# Patient Record
Sex: FEMALE | Race: BLACK OR AFRICAN AMERICAN | NOT HISPANIC OR LATINO
[De-identification: names, ages, dates, MRNs, and addresses within clinical notes are randomized per-mention and may not be internally consistent; named-entity substitution may affect disease eponyms.]

---

## 2019-11-22 VITALS
OXYGEN SATURATION: 100 % | SYSTOLIC BLOOD PRESSURE: 152 MMHG | TEMPERATURE: 100 F | HEART RATE: 91 BPM | DIASTOLIC BLOOD PRESSURE: 70 MMHG | HEIGHT: 62 IN | RESPIRATION RATE: 18 BRPM | WEIGHT: 134.04 LBS

## 2019-11-22 RX ORDER — CHLORHEXIDINE GLUCONATE 213 G/1000ML
1 SOLUTION TOPICAL ONCE
Refills: 0 | Status: DISCONTINUED | OUTPATIENT
Start: 2019-11-25 | End: 2019-11-26

## 2019-11-22 NOTE — H&P ADULT - NSICDXFAMILYHX_GEN_ALL_CORE_FT
FAMILY HISTORY:  FHx: congenital heart disease, father and grandfather both had "similar" congenital abnormalities of the heart. Patient does not know specifics, but knows that her grandfather's needed surgical repair

## 2019-11-22 NOTE — H&P ADULT - NSHPLABSRESULTS_GEN_ALL_CORE
EKG: NSR@ 88bpm, TWI in III, flat T wave in aVF                          12.8   7.66  )-----------( 266      ( 25 Nov 2019 13:04 )             37.7       11-25    139  |  104  |  8   ----------------------------<  85  3.3<L>   |  23  |  0.86    Ca    9.2      25 Nov 2019 13:04    TPro  7.2  /  Alb  4.4  /  TBili  0.3  /  DBili  <0.2  /  AST  19  /  ALT  29  /  AlkPhos  67  11-25      PT/INR - ( 25 Nov 2019 13:04 )   PT: 11.4 sec;   INR: 1.01          PTT - ( 25 Nov 2019 13:04 )  PTT:27.0 sec    CARDIAC MARKERS ( 25 Nov 2019 13:04 )  x     / x     / 89 U/L / x     / <1.0 ng/mL

## 2019-11-22 NOTE — H&P ADULT - RS GEN PE MLT RESP DETAILS PC
no rales/no chest wall tenderness/no intercostal retractions/no rhonchi/no wheezes/clear to auscultation bilaterally

## 2019-11-22 NOTE — H&P ADULT - HISTORY OF PRESENT ILLNESS
35 yo female PMH newly diagnosed malignant anomalous RCA origin, who presented to Dr. Pappas after noticing that her resting HR was 99 bpm on her apple watch. She was a runner but started to notice a progressively decreasing exercise tolerance, was able to run 5 K's but now notices fatigue, palpitations, SOB, and lightheadedness with 5-10 minutes of walking or any running. Separately, she has had episodes of fleeting 4/10 sharp chest pains, non-radiating and not related to exercise. Denies any syncope, orthopnea, PND, LE edema, fevers, chills, or melena. Per patient, she underwent a holter monitor, echo, and exercise stress test, which were all normal. CCTA 11/15/19: Ca score 0. 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 EF 58%. Due to patient's class IV anginal symptoms and abnormal CCTA, patient is now referred for cardiac catheterization. 33 yo female PMH newly diagnosed malignant anomalous RCA origin, who presented to Dr. Pappas after noticing that her resting HR was 99 bpm on her apple watch. She was a runner but started to notice a progressively decreasing exercise tolerance, was able to run 5 K's but now notices fatigue, palpitations, SOB, and lightheadedness with 5-10 minutes of walking or any running. Separately, she has had episodes of fleeting 4/10 sharp chest pains, non-radiating and not related to exercise. Denies any syncope, orthopnea, PND, LE edema, fevers, chills, or melena. Per patient, she underwent a holter monitor, echo, and exercise stress test, which were all normal. CCTA 11/15/19: Ca score 0. 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 EF 58%. Due to patient's class IV anginal symptoms and abnormal CCTA, patient is now referred for cardiac catheterization.

## 2019-11-22 NOTE — H&P ADULT - NSHPSOCIALHISTORY_GEN_ALL_CORE
Admits to hx of daily etoh use, no use in the last month since symptoms worsening.   Denies any tobacco or recreational drug use.

## 2019-11-22 NOTE — H&P ADULT - ATTENDING COMMENTS
Initial contact 11/26/19. See PA HPI written above, for details. I reviewed the PA documentation.  I reviewed vitals, labs, medications, cardiac studies and additional imaging.  I agree with the PA's findings and plans as written above with the following additions/amendments:  Patient now s/p CATH 11/25/19: normal LM, LAD, Lcx, anomalous RCA (dominant) rom L coronary cusp, EF 55%, EDP 15 17 via R radial access  ROS: Patient currently denies cardiopulmonary symptoms. The patient specifically denies CP, SOB, RILEY, LH, dizziness, palpitations.  Physical Exam notable for: young female sitting up in chair in NAD, flat neck veins, RRR, no MGR detected, clear lungs, soft abdomen, no pretibial pitting edema, no ankle edema, skin WWP throughout, A&Ox3, independent  plan:  CTS Br[place] consulted with plans for CABG  Preop exercise NST  recommended and ordered  Dispo pending preop testing and timing of CTSx  SCOT Yoo.  Cardiology Attending

## 2019-11-22 NOTE — H&P ADULT - ASSESSMENT
35 yo female PMH newly diagnosed malignant anomalous RCA origin, who in light of abnormal CTA and CCS IV anginal symptoms presents today for a diagnostic catheterization.     ASA II, Mallampati II    Hgb/HCT 12.8/37.7    Pt agreed to the procedure and signed consent.     Risks & benefits of procedure and alternative therapy have been explained to the patient including but not limited to: allergic reaction, bleeding w/possible need for blood transfusion, infection, renal and vascular compromise, limb damage, arrhythmia, stroke, vessel dissection/perforation, Myocardial infarction, emergent CABG. Informed consent obtained and in chart. 33 yo female PMH newly diagnosed malignant anomalous RCA origin, who in light of abnormal CTA and CCS IV anginal symptoms presents today for a diagnostic catheterization. Pt to follow up with cardiac surgeon next week to discuss possible surgery.     ASA II, Mallampati II    Hgb/HCT 12.8/37.7    Pt agreed to the procedure and signed consent.     Risks & benefits of procedure and alternative therapy have been explained to the patient including but not limited to: allergic reaction, bleeding w/possible need for blood transfusion, infection, renal and vascular compromise, limb damage, arrhythmia, stroke, vessel dissection/perforation, Myocardial infarction, emergent CABG. Informed consent obtained and in chart. 35 yo female PMH newly diagnosed malignant anomalous RCA origin, who in light of abnormal CTA and CCS IV anginal symptoms presents today for a diagnostic catheterization. Pt also being evaluated by CTS for further workout for malignant anomalous RCA origin.    ASA II, Mallampati II    Hgb/HCT 12.8/37.7. As per Dr. Pappas no load given as case is likely diagnostic. IVF NS @75ml/hr. K 3.3 will replete with KCl 40mEq PO x 2.     Pt agreed to the procedure and signed consent.     Risks & benefits of procedure and alternative therapy have been explained to the patient including but not limited to: allergic reaction, bleeding w/possible need for blood transfusion, infection, renal and vascular compromise, limb damage, arrhythmia, stroke, vessel dissection/perforation, Myocardial infarction, emergent CABG. Informed consent obtained and in chart.

## 2019-11-24 ENCOUNTER — FORM ENCOUNTER (OUTPATIENT)
Age: 34
End: 2019-11-24

## 2019-11-25 ENCOUNTER — INPATIENT (INPATIENT)
Facility: HOSPITAL | Age: 34
LOS: 0 days | Discharge: HOME | DRG: 287 | End: 2019-11-26
Attending: INTERNAL MEDICINE | Admitting: INTERNAL MEDICINE
Payer: COMMERCIAL

## 2019-11-25 DIAGNOSIS — Z98.890 OTHER SPECIFIED POSTPROCEDURAL STATES: Chronic | ICD-10-CM

## 2019-11-25 LAB
ALBUMIN SERPL ELPH-MCNC: 4.4 G/DL — SIGNIFICANT CHANGE UP (ref 3.3–5)
ALP SERPL-CCNC: 67 U/L — SIGNIFICANT CHANGE UP (ref 40–120)
ALT FLD-CCNC: 29 U/L — SIGNIFICANT CHANGE UP (ref 10–45)
ANION GAP SERPL CALC-SCNC: 12 MMOL/L — SIGNIFICANT CHANGE UP (ref 5–17)
APTT BLD: 27 SEC — LOW (ref 27.5–36.3)
AST SERPL-CCNC: 19 U/L — SIGNIFICANT CHANGE UP (ref 10–40)
BASOPHILS # BLD AUTO: 0.07 K/UL — SIGNIFICANT CHANGE UP (ref 0–0.2)
BASOPHILS NFR BLD AUTO: 0.9 % — SIGNIFICANT CHANGE UP (ref 0–2)
BILIRUB SERPL-MCNC: 0.3 MG/DL — SIGNIFICANT CHANGE UP (ref 0.2–1.2)
BUN SERPL-MCNC: 8 MG/DL — SIGNIFICANT CHANGE UP (ref 7–23)
CALCIUM SERPL-MCNC: 9.2 MG/DL — SIGNIFICANT CHANGE UP (ref 8.4–10.5)
CHLORIDE SERPL-SCNC: 104 MMOL/L — SIGNIFICANT CHANGE UP (ref 96–108)
CHOLEST SERPL-MCNC: 166 MG/DL — SIGNIFICANT CHANGE UP (ref 10–199)
CK MB CFR SERPL CALC: <1 NG/ML — SIGNIFICANT CHANGE UP (ref 0–6.7)
CK SERPL-CCNC: 89 U/L — SIGNIFICANT CHANGE UP (ref 25–170)
CO2 SERPL-SCNC: 23 MMOL/L — SIGNIFICANT CHANGE UP (ref 22–31)
CREAT SERPL-MCNC: 0.86 MG/DL — SIGNIFICANT CHANGE UP (ref 0.5–1.3)
EOSINOPHIL # BLD AUTO: 0.05 K/UL — SIGNIFICANT CHANGE UP (ref 0–0.5)
EOSINOPHIL NFR BLD AUTO: 0.7 % — SIGNIFICANT CHANGE UP (ref 0–6)
GLUCOSE SERPL-MCNC: 85 MG/DL — SIGNIFICANT CHANGE UP (ref 70–99)
HBA1C BLD-MCNC: 5.1 % — SIGNIFICANT CHANGE UP (ref 4–5.6)
HCG SERPL-ACNC: <0 MIU/ML — SIGNIFICANT CHANGE UP
HCT VFR BLD CALC: 37.7 % — SIGNIFICANT CHANGE UP (ref 34.5–45)
HDLC SERPL-MCNC: 42 MG/DL — LOW
HGB BLD-MCNC: 12.8 G/DL — SIGNIFICANT CHANGE UP (ref 11.5–15.5)
IMM GRANULOCYTES NFR BLD AUTO: 0.3 % — SIGNIFICANT CHANGE UP (ref 0–1.5)
INR BLD: 1.01 — SIGNIFICANT CHANGE UP (ref 0.88–1.16)
LIPID PNL WITH DIRECT LDL SERPL: 83 MG/DL — SIGNIFICANT CHANGE UP
LYMPHOCYTES # BLD AUTO: 2.05 K/UL — SIGNIFICANT CHANGE UP (ref 1–3.3)
LYMPHOCYTES # BLD AUTO: 26.8 % — SIGNIFICANT CHANGE UP (ref 13–44)
MCHC RBC-ENTMCNC: 30 PG — SIGNIFICANT CHANGE UP (ref 27–34)
MCHC RBC-ENTMCNC: 34 GM/DL — SIGNIFICANT CHANGE UP (ref 32–36)
MCV RBC AUTO: 88.5 FL — SIGNIFICANT CHANGE UP (ref 80–100)
MONOCYTES # BLD AUTO: 0.54 K/UL — SIGNIFICANT CHANGE UP (ref 0–0.9)
MONOCYTES NFR BLD AUTO: 7 % — SIGNIFICANT CHANGE UP (ref 2–14)
NEUTROPHILS # BLD AUTO: 4.93 K/UL — SIGNIFICANT CHANGE UP (ref 1.8–7.4)
NEUTROPHILS NFR BLD AUTO: 64.3 % — SIGNIFICANT CHANGE UP (ref 43–77)
NRBC # BLD: 0 /100 WBCS — SIGNIFICANT CHANGE UP (ref 0–0)
PLATELET # BLD AUTO: 266 K/UL — SIGNIFICANT CHANGE UP (ref 150–400)
POTASSIUM SERPL-MCNC: 3.3 MMOL/L — LOW (ref 3.5–5.3)
POTASSIUM SERPL-SCNC: 3.3 MMOL/L — LOW (ref 3.5–5.3)
PROT SERPL-MCNC: 7.2 G/DL — SIGNIFICANT CHANGE UP (ref 6–8.3)
PROTHROM AB SERPL-ACNC: 11.4 SEC — SIGNIFICANT CHANGE UP (ref 10–12.9)
RBC # BLD: 4.26 M/UL — SIGNIFICANT CHANGE UP (ref 3.8–5.2)
RBC # FLD: 12.5 % — SIGNIFICANT CHANGE UP (ref 10.3–14.5)
SODIUM SERPL-SCNC: 139 MMOL/L — SIGNIFICANT CHANGE UP (ref 135–145)
TOTAL CHOLESTEROL/HDL RATIO MEASUREMENT: 4 RATIO — SIGNIFICANT CHANGE UP (ref 3.3–7.1)
TRIGL SERPL-MCNC: 205 MG/DL — HIGH (ref 10–149)
WBC # BLD: 7.66 K/UL — SIGNIFICANT CHANGE UP (ref 3.8–10.5)
WBC # FLD AUTO: 7.66 K/UL — SIGNIFICANT CHANGE UP (ref 3.8–10.5)

## 2019-11-25 PROCEDURE — 93306 TTE W/DOPPLER COMPLETE: CPT | Mod: 26

## 2019-11-25 PROCEDURE — 94010 BREATHING CAPACITY TEST: CPT | Mod: 26

## 2019-11-25 PROCEDURE — 71045 X-RAY EXAM CHEST 1 VIEW: CPT | Mod: 26

## 2019-11-25 PROCEDURE — 93458 L HRT ARTERY/VENTRICLE ANGIO: CPT | Mod: 26

## 2019-11-25 PROCEDURE — 93880 EXTRACRANIAL BILAT STUDY: CPT | Mod: 26

## 2019-11-25 PROCEDURE — 99252 IP/OBS CONSLTJ NEW/EST SF 35: CPT

## 2019-11-25 RX ORDER — ACETAMINOPHEN 500 MG
650 TABLET ORAL ONCE
Refills: 0 | Status: COMPLETED | OUTPATIENT
Start: 2019-11-25 | End: 2019-11-25

## 2019-11-25 RX ORDER — SODIUM CHLORIDE 9 MG/ML
500 INJECTION INTRAMUSCULAR; INTRAVENOUS; SUBCUTANEOUS
Refills: 0 | Status: DISCONTINUED | OUTPATIENT
Start: 2019-11-25 | End: 2019-11-25

## 2019-11-25 RX ORDER — MECLIZINE HCL 12.5 MG
12.5 TABLET ORAL ONCE
Refills: 0 | Status: DISCONTINUED | OUTPATIENT
Start: 2019-11-25 | End: 2019-11-26

## 2019-11-25 RX ORDER — INFLUENZA VIRUS VACCINE 15; 15; 15; 15 UG/.5ML; UG/.5ML; UG/.5ML; UG/.5ML
0.5 SUSPENSION INTRAMUSCULAR ONCE
Refills: 0 | Status: COMPLETED | OUTPATIENT
Start: 2019-11-25 | End: 2019-11-25

## 2019-11-25 RX ORDER — SODIUM CHLORIDE 9 MG/ML
500 INJECTION INTRAMUSCULAR; INTRAVENOUS; SUBCUTANEOUS
Refills: 0 | Status: DISCONTINUED | OUTPATIENT
Start: 2019-11-25 | End: 2019-11-26

## 2019-11-25 RX ORDER — POTASSIUM CHLORIDE 20 MEQ
40 PACKET (EA) ORAL ONCE
Refills: 0 | Status: DISCONTINUED | OUTPATIENT
Start: 2019-11-25 | End: 2019-11-26

## 2019-11-25 RX ADMIN — Medication 650 MILLIGRAM(S): at 19:37

## 2019-11-25 RX ADMIN — Medication 650 MILLIGRAM(S): at 22:02

## 2019-11-25 NOTE — CHART NOTE - NSCHARTNOTEFT_GEN_A_CORE
PA called by RN as pt. c/o blurry vision and bright lights; pt. seen at bedside; laying down comfortably; VSS; AO X 3; neuro exam normal; reports that she has hx of vertigo in the past and the sx are similar to prior vertigo attack; Fellow Angelina aware; pt. given Meclizine 12.5 mg PO X 1; will continue to monitor.

## 2019-11-25 NOTE — CONSULT NOTE ADULT - SUBJECTIVE AND OBJECTIVE BOX
Preventive Cardiology Consultation Note    Cardiologist - Dr. Francesco Sheriff     CHIEF COMPLAINT: s/p cardiac catheterization requiring cardiovascular prevention optimization and education    HISTORY OF PRESENT ILLNESS: 35 [facility] [place] [facility], with no significant PMHx, and newly diagnosed malignant anomalous RCA origin, discovered on CCTA 11/15/19: Ca score 0. 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 Patient is now s/p cardiac cath today prior to CT surgery 11/25/19: normal LM, LAD, Lcx; anomalous RCA (dominant) rom L coronary cusp.    Review of systems otherwise negative.     Lifestyle History:  Mediterranean Diet Score (9 question survey) was 7.   (8-9: optimal, 6-7: near-optimal, 4-5: suboptimal, 0-3: markedly suboptimal)  Exercise: Patient reports exercising at a moderate level for 120-150 minutes per week.   Smoking: Patient denies any history of smoking.   Stress: Patient denies any stress.     PAST MEDICAL & SURGICAL HISTORY:  Anomalous origin of right coronary artery  H/O inguinal hernia repair: age 4    FAMILY HISTORY:   Patient denies any first degree family history of premature CAD or CVA.   Congenital heart disease, father and grandfather both had "similar" congenital abnormalities    Allergies:   lactose (Diarrhea)  No Known Allergies      HOME MEDICATIONS:   Microgestin 1/20: Pt takes Microgynon 30  (25 Nov 2019 14:06)      PHYSICAL EXAM:  · Temp (F)	99.5 Degrees F	  · Temp (C)	37.5 Degrees C	  · Heart Rate	91 /min	  · Respiration Rate (breaths/min)	18 /min	  · BP Systolic	152 mm Hg	  · BP Diastolic	70 mm Hg	  · Blood Pressure - Method	electronic	  · BP Noninvasive Mean	97 mm Hg	  · SpO2 (%)	100 %	  · O2 delivery	room air	    Height (cm): 157.48 (11-25 @ 14:17)  Weight (kg): 60.8 (11-25 @ 14:17)  BMI (kg/m2): 24.5 (11-25 @ 14:17)  	  Gen- awake, conversive  Head-NCAT; eyes: no corneal arcus noted b/l; no xanthelasmas   Neck- no thyromegaly, no cervical LAD, no JVD, no carotid bruit b/l  Respiratory- good air entry b/l, no WRR  Cardiovascular- S1S2, RRR, no MRG appr, no LE edema b/l, distal pulses 2+ b/l  Gastrointestinal- no HSM, no masses  Neurology- moves all extremities, no focal deficits  Psych- normal affect, non-depressed mood  Skin- no lesions, no rashes, no xanthomas     LABS:	                          12.8   7.66  )-----------( 266      ( 25 Nov 2019 13:04 )             37.7     11-25    139  |  104  |  8   ----------------------------<  85  3.3<L>   |  23  |  0.86    Ca    9.2      25 Nov 2019 13:04    TPro  7.2  /  Alb  4.4  /  TBili  0.3  /  DBili  <0.2  /  AST  19  /  ALT  29  /  AlkPhos  67  11-25      Hemoglobin A1C, Whole Blood: 5.1 % (11-25 @ 13:04)      Cholesterol, Serum: 166 mg/dL (11-25 @ 13:04)  HDL Cholesterol, Serum: 42 mg/dL (11-25 @ 13:04)  Triglycerides, Serum: 205 mg/dL (11-25 @ 13:04)  Direct LDL: 83 mg/dL (11-25 @ 13:04)      ASSESSMENT/RECOMMENDATIONS: 	  Patient's dietary, exercise and overall lifestyle habits were reviewed. The concept of atherosclerosis and its systemic nature was discussed with a focus on the need to get all cardiovascular risk factors to goal. At this time, I would like to make the following recommendations to optimize atherosclerotic risk factors.     RECOMMENDATIONS:   Anti-platelet Therapy: APT per interventionalist recommendation.   Lipid Therapy: Patient denies any previous statin or lipid lowering therapy. Her cardiac cath today revealed normal coronaries in regard to atherosclerosis, and she is at a low risk of ASCVD. She has quite a good lifestyle and has always been active and a runner. It is reasonable to continue with lifestyle alone from a prevention standpoint at this time.   Hypertension: Blood pressures during this stay were well-controlled.   Mediterranean Diet Score is 7. Some suggestions include continue incorporating 2 or more servings per day of vegetables, fruits, and whole grains. Increase intake of fish and legumes/beans to 2 or more servings per week. Aim to increase intake of healthy fats, such as olive oil and avocados, and have a handful of nuts/seeds most days. Reduce red/processed meat consumption to 2 or fewer times per week.   Exercise: Recommended gradually increasing activity to 30-45 minutes most days of the week once cleared by referring cardiologist.   Medication Adherence: Patient has no issues with adherence at this time.   Smoking: This patient is a non-smoker.   Obesity/Overweight: The patient is at a normal weight currently, and BMI is WNL at 24.5.  Glucometabolic State: Patient's blood sugar is well-controlled at this time. HbA1c today was 5.1%.   Sleep Apnea: The patient is at low risk for sleep apnea.   Psychological Stress: The patient appears to be coping with stressors well at this time.     Thank you for the opportunity to see this patient. Please feel free to contact Prevention if there are any questions, or if you feel that your patient would benefit from continued follow-up visits with the Program.    I am acting as a scribe on behalf of Dr. Opal Doan,    Margo Geronimo, [facility]  Cardiovascular Prevention     Opal Doan MD  System Director, Cardiovascular Prevention

## 2019-11-26 ENCOUNTER — TRANSCRIPTION ENCOUNTER (OUTPATIENT)
Age: 34
End: 2019-11-26

## 2019-11-26 VITALS
SYSTOLIC BLOOD PRESSURE: 141 MMHG | DIASTOLIC BLOOD PRESSURE: 77 MMHG | RESPIRATION RATE: 18 BRPM | HEART RATE: 96 BPM | OXYGEN SATURATION: 96 %

## 2019-11-26 PROBLEM — Z00.00 ENCOUNTER FOR PREVENTIVE HEALTH EXAMINATION: Status: ACTIVE | Noted: 2019-11-26

## 2019-11-26 PROBLEM — Q24.5 MALFORMATION OF CORONARY VESSELS: Chronic | Status: ACTIVE | Noted: 2019-11-22

## 2019-11-26 LAB
ANION GAP SERPL CALC-SCNC: 10 MMOL/L — SIGNIFICANT CHANGE UP (ref 5–17)
APPEARANCE UR: CLEAR — SIGNIFICANT CHANGE UP
BILIRUB UR-MCNC: NEGATIVE — SIGNIFICANT CHANGE UP
BLD GP AB SCN SERPL QL: NEGATIVE — SIGNIFICANT CHANGE UP
BUN SERPL-MCNC: 8 MG/DL — SIGNIFICANT CHANGE UP (ref 7–23)
CALCIUM SERPL-MCNC: 8.9 MG/DL — SIGNIFICANT CHANGE UP (ref 8.4–10.5)
CHLORIDE SERPL-SCNC: 108 MMOL/L — SIGNIFICANT CHANGE UP (ref 96–108)
CO2 SERPL-SCNC: 22 MMOL/L — SIGNIFICANT CHANGE UP (ref 22–31)
COLOR SPEC: YELLOW — SIGNIFICANT CHANGE UP
CREAT SERPL-MCNC: 0.88 MG/DL — SIGNIFICANT CHANGE UP (ref 0.5–1.3)
DIFF PNL FLD: ABNORMAL
GLUCOSE SERPL-MCNC: 104 MG/DL — HIGH (ref 70–99)
GLUCOSE UR QL: NEGATIVE — SIGNIFICANT CHANGE UP
HCT VFR BLD CALC: 38.3 % — SIGNIFICANT CHANGE UP (ref 34.5–45)
HGB BLD-MCNC: 12.5 G/DL — SIGNIFICANT CHANGE UP (ref 11.5–15.5)
KETONES UR-MCNC: NEGATIVE — SIGNIFICANT CHANGE UP
LEUKOCYTE ESTERASE UR-ACNC: NEGATIVE — SIGNIFICANT CHANGE UP
MAGNESIUM SERPL-MCNC: 2.1 MG/DL — SIGNIFICANT CHANGE UP (ref 1.6–2.6)
MCHC RBC-ENTMCNC: 29.6 PG — SIGNIFICANT CHANGE UP (ref 27–34)
MCHC RBC-ENTMCNC: 32.6 GM/DL — SIGNIFICANT CHANGE UP (ref 32–36)
MCV RBC AUTO: 90.5 FL — SIGNIFICANT CHANGE UP (ref 80–100)
NITRITE UR-MCNC: NEGATIVE — SIGNIFICANT CHANGE UP
NRBC # BLD: 0 /100 WBCS — SIGNIFICANT CHANGE UP (ref 0–0)
PH UR: 7 — SIGNIFICANT CHANGE UP (ref 5–8)
PLATELET # BLD AUTO: 260 K/UL — SIGNIFICANT CHANGE UP (ref 150–400)
POTASSIUM SERPL-MCNC: 4.1 MMOL/L — SIGNIFICANT CHANGE UP (ref 3.5–5.3)
POTASSIUM SERPL-SCNC: 4.1 MMOL/L — SIGNIFICANT CHANGE UP (ref 3.5–5.3)
PROT UR-MCNC: NEGATIVE MG/DL — SIGNIFICANT CHANGE UP
RBC # BLD: 4.23 M/UL — SIGNIFICANT CHANGE UP (ref 3.8–5.2)
RBC # FLD: 12.4 % — SIGNIFICANT CHANGE UP (ref 10.3–14.5)
RH IG SCN BLD-IMP: POSITIVE — SIGNIFICANT CHANGE UP
SODIUM SERPL-SCNC: 140 MMOL/L — SIGNIFICANT CHANGE UP (ref 135–145)
SP GR SPEC: 1.01 — SIGNIFICANT CHANGE UP (ref 1–1.03)
TSH SERPL-MCNC: 0.89 UIU/ML — SIGNIFICANT CHANGE UP (ref 0.35–4.94)
UROBILINOGEN FLD QL: 0.2 E.U./DL — SIGNIFICANT CHANGE UP
WBC # BLD: 9.05 K/UL — SIGNIFICANT CHANGE UP (ref 3.8–10.5)
WBC # FLD AUTO: 9.05 K/UL — SIGNIFICANT CHANGE UP (ref 3.8–10.5)

## 2019-11-26 PROCEDURE — 85610 PROTHROMBIN TIME: CPT

## 2019-11-26 PROCEDURE — 83036 HEMOGLOBIN GLYCOSYLATED A1C: CPT

## 2019-11-26 PROCEDURE — 80061 LIPID PANEL: CPT

## 2019-11-26 PROCEDURE — 86901 BLOOD TYPING SEROLOGIC RH(D): CPT

## 2019-11-26 PROCEDURE — C1887: CPT

## 2019-11-26 PROCEDURE — 83735 ASSAY OF MAGNESIUM: CPT

## 2019-11-26 PROCEDURE — 78452 HT MUSCLE IMAGE SPECT MULT: CPT

## 2019-11-26 PROCEDURE — 93018 CV STRESS TEST I&R ONLY: CPT

## 2019-11-26 PROCEDURE — 85025 COMPLETE CBC W/AUTO DIFF WBC: CPT

## 2019-11-26 PROCEDURE — 36415 COLL VENOUS BLD VENIPUNCTURE: CPT

## 2019-11-26 PROCEDURE — 82550 ASSAY OF CK (CPK): CPT

## 2019-11-26 PROCEDURE — 84443 ASSAY THYROID STIM HORMONE: CPT

## 2019-11-26 PROCEDURE — 80053 COMPREHEN METABOLIC PANEL: CPT

## 2019-11-26 PROCEDURE — 93017 CV STRESS TEST TRACING ONLY: CPT

## 2019-11-26 PROCEDURE — A9500: CPT

## 2019-11-26 PROCEDURE — 86900 BLOOD TYPING SEROLOGIC ABO: CPT

## 2019-11-26 PROCEDURE — 93880 EXTRACRANIAL BILAT STUDY: CPT

## 2019-11-26 PROCEDURE — C1894: CPT

## 2019-11-26 PROCEDURE — 80048 BASIC METABOLIC PNL TOTAL CA: CPT

## 2019-11-26 PROCEDURE — 93306 TTE W/DOPPLER COMPLETE: CPT

## 2019-11-26 PROCEDURE — 82553 CREATINE MB FRACTION: CPT

## 2019-11-26 PROCEDURE — 78452 HT MUSCLE IMAGE SPECT MULT: CPT | Mod: 26

## 2019-11-26 PROCEDURE — C1769: CPT

## 2019-11-26 PROCEDURE — 81001 URINALYSIS AUTO W/SCOPE: CPT

## 2019-11-26 PROCEDURE — 71045 X-RAY EXAM CHEST 1 VIEW: CPT

## 2019-11-26 PROCEDURE — 93016 CV STRESS TEST SUPVJ ONLY: CPT

## 2019-11-26 PROCEDURE — 94150 VITAL CAPACITY TEST: CPT

## 2019-11-26 PROCEDURE — 99222 1ST HOSP IP/OBS MODERATE 55: CPT

## 2019-11-26 PROCEDURE — 85027 COMPLETE CBC AUTOMATED: CPT

## 2019-11-26 PROCEDURE — 84702 CHORIONIC GONADOTROPIN TEST: CPT

## 2019-11-26 PROCEDURE — 86850 RBC ANTIBODY SCREEN: CPT

## 2019-11-26 PROCEDURE — 82248 BILIRUBIN DIRECT: CPT

## 2019-11-26 PROCEDURE — 99223 1ST HOSP IP/OBS HIGH 75: CPT

## 2019-11-26 PROCEDURE — A9505: CPT

## 2019-11-26 PROCEDURE — 85730 THROMBOPLASTIN TIME PARTIAL: CPT

## 2019-11-26 NOTE — DISCHARGE NOTE PROVIDER - PROVIDER TOKENS
PROVIDER:[TOKEN:[[id number]]] PROVIDER:[TOKEN:[[id number]],SCHEDULEDAPPT:[11/27/2019],SCHEDULEDAPPTTIME:[10:15 AM]] PROVIDER:[TOKEN:[[id number]]]

## 2019-11-26 NOTE — CONSULT NOTE ADULT - SUBJECTIVE AND OBJECTIVE BOX
Surgeon: Dr. Wooten    Requesting Physician: Dr. Cesar Pappas    HISTORY OF PRESENT ILLNESS (Need 4):    35 yo female, [facility] resident, with no significant PMHx, who began noticing decreasing exercise tolerance over the past 3 years.  She had previously been very active and was a regular runner, but began developing dyspnea and chest tightness that would limit her.  She was seen by a cardiologist 3 years ago and found to have some ectopic beats and was placed on a beta blocker without any symtpomatic improvement.  Her symptoms continued to worsen to the point that she would get symptomatic after walking for 10 minutes.  She also reports occasional resting symptoms which would occur once or twice a week, but she feels that these resting symptoms have become less frequent in the past year, although her exertional symptoms have increased.  The exertional symptoms are also associated with lightheadedness, but she denies any episodes of syncope.  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  This finding prompted a diagnostic left heart cath on 11/15 which confirmed the finding.  Of note, she reports her father was diagnosed with a similar coronary anomaly on routine screening when he was in his 50's, and was offered a surgical option but deferred 2/2 his age and minimal symptoms.  Her paternal grandfather also had a history of coronary disease although it is not known if this was related to anomalous coronary anatomy.      PAST MEDICAL & SURGICAL HISTORY:  Anomalous origin of right coronary artery  H/O inguinal hernia repair: age 4      MEDICATIONS  (STANDING):  chlorhexidine 4% Liquid 1 Application(s) Topical once  meclizine 12.5 milliGRAM(s) Oral Once  potassium chloride    Tablet ER 40 milliEquivalent(s) Oral Once  sodium chloride 0.9%. 500 milliLiter(s) (75 mL/Hr) IV Continuous <Continuous>    MEDICATIONS  (PRN):      Allergies    No Known Allergies    Intolerances    lactose (Diarrhea)    SOCIAL HISTORY:  Smoker:   NO          ETOH use:   NO, former daily drinker but quit years ago            Ilicit Drug use:   NO  Occupation:   Assisted device use (Cane / Walker):  Live with: lives independently in [facility], currently staying in a hotel in [place] with boyfriend, but does not have a residence in [place]    FAMILY HISTORY:  FHx: congenital heart disease: father and grandfather both had similar congenital abnormalities of the heart.     Review of Systems (Need 10):  CONSTITUTIONAL: Denies fevers / chills, sweats, fatigue, weight loss, weight gain                                       NEURO:  dizziness/lightheadedness as per HPI, also reports infrequent migrains but denies parathesias, seizures, syncope, confusion                                                                                  EYES:  Denies blurry vision, discharge, pain, loss of vision                                                                                    ENMT:  hx of vertigo, but denies difficulty hearing, dysphagia, epistaxis, recent dental work                                       CV:  chest pressure and RILEY as per HPI but denies palpitations or1 orthopnea                                                                                           RESPIRATORY:  Denies wWheezing, SOB, cough / sputum, hemoptysis                                                               GI:  Denies nausea, vomiting, diarrhea, constipation, melena                                                                          : Denies hematuria, dysuria, urgency, incontinence                                                                                          MUSKULOSKELETAL:  Denies arthritis, joint swelling, muscle weakness                                                             SKIN/BREAST:  Denies rash, itching, hair loss, masses                                                                                              PSYCH:  Denies depression, anxiety, suicidal ideation                                                                                                HEME/LYMPH:  Denies bruises easily, enlarged lymph nodes, tender lymph nodes                                          ENDOCRINE:  Denies cold intolerance, heat intolerance, polydipsia                                                                      Vital Signs Last 24 Hrs  T(C): 36.3 (26 Nov 2019 09:06), Max: 36.3 (26 Nov 2019 00:12)  T(F): 97.4 (26 Nov 2019 09:06), Max: 97.4 (26 Nov 2019 09:06)  HR: 82 (26 Nov 2019 08:54) (78 - 90)  BP: 131/78 (26 Nov 2019 08:54) (110/67 - 132/87)  BP(mean): --  RR: 18 (26 Nov 2019 08:54) (16 - 18)  SpO2: 96% (26 Nov 2019 08:54) (95% - 97%)    Physical Exam (Need 8)  CONSTITUTIONAL: WN/WD, NAD  NEURO: A&Ox3                   EYES: EOMI sclera anicteric  ENMT: MMM  CV: S1S2 RRR  RESPIRATORY: CTA b/l no W/R/R  GI: soft NT/ND +BS  : no welch  MUSKULOSKELETAL: no kyphosis/scoliosis  SKIN / BREAST: no rashes/lesions                                                            LABS:                        12.5   9.05  )-----------( 260      ( 26 Nov 2019 06:33 )             38.3     11-26    140  |  108  |  8   ----------------------------<  104<H>  4.1   |  22  |  0.88    Ca    8.9      26 Nov 2019 06:33  Mg     2.1     11-26    TPro  7.2  /  Alb  4.4  /  TBili  0.3  /  DBili  <0.2  /  AST  19  /  ALT  29  /  AlkPhos  67  11-25    PT/INR - ( 25 Nov 2019 13:04 )   PT: 11.4 sec;   INR: 1.01          PTT - ( 25 Nov 2019 13:04 )  PTT:27.0 sec    CARDIAC MARKERS ( 25 Nov 2019 13:04 )  x     / x     / 89 U/L / x     / <1.0 ng/mL    RADIOLOGY & ADDITIONAL STUDIES:  CAROTID U/S: no HD signif stenosis    CXR: no efusions/infiltrates/PTX    CT Scan: coronary CTA findings as above    EKG: NSR    TTE / FLORIDALMA: EF 65%, no HD signif valvular disease    Cardiac Cath: anomalous RCA as above

## 2019-11-26 NOTE — DISCHARGE NOTE PROVIDER - CARE PROVIDERS DIRECT ADDRESSES
[email or web address]

## 2019-11-26 NOTE — DISCHARGE NOTE PROVIDER - CARE PROVIDER_API CALL
Ventura Wooten (MD)  Surgery; Thoracic and Cardiac Surgery  [address] [place] [address]  Phone: [phone]  Fax: [phone]  Follow Up Time: Ventrua Wooten (MD)  Surgery; Thoracic and Cardiac Surgery  [address] [place] [address]  Phone: [phone]  Fax: [phone]  Scheduled Appointment: 11/27/2019 10:15 AM Ventura Wooten (MD)  Surgery; Thoracic and Cardiac Surgery  [address] [place] [address]  Phone: [phone]  Fax: [phone]  Follow Up Time:

## 2019-11-26 NOTE — DISCHARGE NOTE PROVIDER - NSDCCPCAREPLAN_GEN_ALL_CORE_FT
PRINCIPAL DISCHARGE DIAGNOSIS  Diagnosis: Anomalous right coronary artery  Assessment and Plan of Treatment: You were admitted to evaluate chest pain and shortness of breath with an abnormal CT scan of the heart arteries. An ultrasound of the heart (echocardiogram) was preformed which was normal. A cardiac catheterization was preformed and showed your heart arteries were clear. A stress test was preformed which was normal. CT surgery was consulted and did pre operative testing. They scheduled you for treatment of the anomalous right coronary artery with a surgery on Monday 12/2/19. Please follow up with the NPs tomorrow 11/27/19 in the [facility] of [facility]. The information has been attached for you. Please make an appointment and ensure you go to the appointment tomorrow. No medications have been added to your regemin. PRINCIPAL DISCHARGE DIAGNOSIS  Diagnosis: Anomalous right coronary artery  Assessment and Plan of Treatment: You were admitted to evaluate chest pain and shortness of breath with an abnormal CT scan of the heart arteries. An ultrasound of the heart (echocardiogram) was preformed which was normal. A cardiac catheterization was preformed and showed your heart arteries were clear. A stress test was preformed which was normal. CT surgery was consulted and did pre operative testing. They scheduled you for treatment of the anomalous right coronary artery with a surgery on Monday 12/2/19. Please follow up with the NPs tomorrow 11/27/19 at 10:15AM in the [place] [facility] of [facility] on the 4th floor. The information has been attached for you.  No medications have been added to your regemin. PRINCIPAL DISCHARGE DIAGNOSIS  Diagnosis: Anomalous right coronary artery  Assessment and Plan of Treatment: You were admitted to evaluate chest pain and shortness of breath with an abnormal CT scan of the heart arteries. An ultrasound of the heart (echocardiogram) was preformed which was normal. A cardiac catheterization was preformed and showed your heart arteries were clear. A stress test was preformed which was normal. CT surgery was consulted and did pre operative testing. It was decided you should meet in person with Dr. Wooten ([facility]) on Monday 12/2/19 to further discuss the surgery and if there is a true indication for it. You do NOT need to go to your appointment tomorrow on the 4th floor of [facility]. Someone from [facility] will call you to set up the time for this appointment. If they do not, I have attached the phone number for Dr. Wooten's office. After your meeting, Dr. Pappas would like you to call him on his cell phone [phone]. No medications have been added to your regemin.

## 2019-11-26 NOTE — DIETITIAN INITIAL EVALUATION ADULT. - ENERGY NEEDS
IBW used to calculate energy needs due to pt's current body weight exceeding 120% of IBW  Needs calculated for age and weight maintenance.

## 2019-11-26 NOTE — DISCHARGE NOTE NURSING/CASE MANAGEMENT/SOCIAL WORK - PATIENT PORTAL LINK FT
You can access the FollowMyHealth Patient Portal offered by [facility] by registering at the following website: [email or web address]. By joining [employer]’s FollowMyHealth portal, you will also be able to view your health information using other applications (apps) compatible with our system.

## 2019-11-26 NOTE — DIETITIAN INITIAL EVALUATION ADULT. - OTHER INFO
Pt with past medical history significant for dyspnea and chest tightness with exercise x3 years.  Pt is s/p coronary CTA (11/15) showing malignant anomalous RCA originating from the left coronary sinus and coursing between the PA trunk and the anterior aortic trunk.  Pt is s/p diagnostic cath confirming findings.  Plan for transfer to CTICU for CABG 12/2.  Pt was following a Regular diet prior to admission; good appetite/intake at baseline.  Pt with good appetite since admission; consuming ~75% of meals.  Pt denies n/v/d/c or pain.  Skin: intact pressure-wise, surgical incision to right wrist.

## 2019-11-26 NOTE — DISCHARGE NOTE PROVIDER - NSDCFUSCHEDAPPT_GEN_ALL_CORE_FT
SHAWN MUÑIZ ; 12/02/2019 ; [facility] [address] SHAWN MUÑIZ ; 11/27/2019 ; [facility] CT Surg [address]  SHAWN MUÑIZ ; 12/02/2019 ; [facility] [address] SHAWN MUÑIZ ; 11/27/2019 ; hospitals CT Surg [address]  SHAWN MUÑIZ ; 12/02/2019 ; [facility] [address]

## 2019-11-26 NOTE — CONSULT NOTE ADULT - ASSESSMENT
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 and coursing between the PA trunk and the anterior aortic trunk     Plan:  Problem 1: anomalous RCA   - stress echo today, f/u results   - plan for CABG Monday   - complete pre-op workup with PFTs and T&S    Problem 2: Migraines/Vertigo   - had an episode of vertigo overnight, responded well to meclizine   - c/w meclizine prn    Problem 3: dyslipidemia   - elevated TG levels with low HDL   - preventative cardiology recommendations appreciated, c/w recommended lifestyle changes   - cont to monitor lipid profile annually and consider statin if no signif improvements with lifestyle changes    Problem 4: hypokalemia   - potassium repleted and repeat K WNL   - cont to monitor renal function and lytes    I have reviewed clinical labs tests and reports, radiology tests and reports, as well as old patient medical records, and discussed with the refering physician.

## 2019-11-29 PROBLEM — Z82.79 FAMILY HISTORY OF FIRST DEGREE RELATIVE WITH CONGENITAL HEART DISEASE: Status: ACTIVE | Noted: 2019-11-29

## 2019-12-02 ENCOUNTER — APPOINTMENT (OUTPATIENT)
Dept: CARDIOTHORACIC SURGERY | Facility: CLINIC | Age: 34
End: 2019-12-02
Payer: COMMERCIAL

## 2019-12-02 VITALS
HEART RATE: 95 BPM | SYSTOLIC BLOOD PRESSURE: 137 MMHG | TEMPERATURE: 99.2 F | DIASTOLIC BLOOD PRESSURE: 73 MMHG | RESPIRATION RATE: 18 BRPM | BODY MASS INDEX: 24.11 KG/M2 | WEIGHT: 131 LBS | HEIGHT: 62 IN | OXYGEN SATURATION: 99 %

## 2019-12-02 DIAGNOSIS — Q24.5 MALFORMATION OF CORONARY VESSELS: ICD-10-CM

## 2019-12-02 DIAGNOSIS — I20.0 UNSTABLE ANGINA: ICD-10-CM

## 2019-12-02 DIAGNOSIS — Z78.9 OTHER SPECIFIED HEALTH STATUS: ICD-10-CM

## 2019-12-02 DIAGNOSIS — E87.6 HYPOKALEMIA: ICD-10-CM

## 2019-12-02 DIAGNOSIS — G43.909 MIGRAINE, UNSPECIFIED, NOT INTRACTABLE, WITHOUT STATUS MIGRAINOSUS: ICD-10-CM

## 2019-12-02 DIAGNOSIS — I20.9 ANGINA PECTORIS, UNSPECIFIED: ICD-10-CM

## 2019-12-02 PROCEDURE — 99213 OFFICE O/P EST LOW 20 MIN: CPT

## 2019-12-03 VITALS
WEIGHT: 131 LBS | DIASTOLIC BLOOD PRESSURE: 73 MMHG | OXYGEN SATURATION: 99 % | BODY MASS INDEX: 24.11 KG/M2 | RESPIRATION RATE: 18 BRPM | TEMPERATURE: 99.2 F | HEART RATE: 95 BPM | SYSTOLIC BLOOD PRESSURE: 137 MMHG | HEIGHT: 62 IN

## 2019-12-03 VITALS
HEIGHT: 62 IN | WEIGHT: 134.04 LBS | TEMPERATURE: 100 F | SYSTOLIC BLOOD PRESSURE: 152 MMHG | RESPIRATION RATE: 18 BRPM | HEART RATE: 91 BPM | OXYGEN SATURATION: 100 % | DIASTOLIC BLOOD PRESSURE: 70 MMHG

## 2019-12-03 PROBLEM — Z78.9 NO PERTINENT PAST MEDICAL HISTORY: Status: RESOLVED | Noted: 2019-12-03 | Resolved: 2019-12-03

## 2019-12-03 PROBLEM — Z78.9 NON-SMOKER: Status: ACTIVE | Noted: 2019-12-03

## 2019-12-03 NOTE — H&P ADULT - HISTORY OF PRESENT ILLNESS
35 yo female, [facility], with no significant PMHx and significant family history for congenital heart disease (see below), who began noticing decreasing exercise tolerance over the past 3 years. She had previously been very active and was a regular runner, but began developing dyspnea and chest tightness that would limit her. She was seen by a cardiologist 3 years ago and found to have some ectopic beats and was placed on a beta blocker without any symptomatic improvement. Her symptoms continued to worsen to the point that she would get symptomatic after walking for 10 minutes. She also reports occasional resting symptoms which would occur once or twice a week, but she feels that these resting symptoms have become less frequent in the past year, although her exertional symptoms have increased. The exertional symptoms are also associated with lightheadedness, but she denies any episodes of syncope. 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 She was then consulted by Dr. Wooten for CT surgery and deemed a surgical candidate. She presents today for elective surgery.      Of note, she reports her father was diagnosed with a similar coronary anomaly on routine screening when he was in his 50's, and was offered a surgical option but deferred 2/2 his age and minimal symptoms. Her paternal grandfather also had a history of coronary disease although it is not known if this was related to anomalous coronary anatomy.

## 2019-12-03 NOTE — CONSULT LETTER
[FreeTextEntry2] : Dr. Wade Pappas  [FreeTextEntry1] : \par I had the pleasure of seeing your patient, SHAWN MUÑIZ,in my office today. \par \par We take a multidisciplinary team approach to patient care and consider you, the physician, an extension of our team. We will maintain an open line of communication with you throughout your patient's treatment course. \par \par She is being evaluated for an anomalous RCA. I have reviewed all of the patient's medical records and diagnostic images at the time of her office consultation. I have enclosed a copy for your records. \par \par I have reviewed the indications for surgery,and used our webpage [email or web address] <[email or web address]> to illustrate the aorta and anatomy of the heart. The patient meets criteria for surgery. I have recommended that the patient is a candidate for UNROOFING OF RCA, POSSIBLE CABG. \par \par Surgery is scheduled for Thursday, December 5, 2019. I will update you on her perioperative status and disposition upon discharge. \par \par I appreciate the opportunity to care for your patient at the [facility] for [facility] based at [facility]. If there are any questions or concerns, please call me directly at [phone]. \par  \par  [FreeTextEntry3] : \par Ventura Wooten M.D.\par Professor of Cardiovascular and Thoracic Surgery\par Minimally Invasive Valve Surgeon\par Director of Aortic Surgery, [facility]\par Cell: [phone]\par Email: [email or web address] \par \par [facility]:\par [address], [address] Floor, [place] [address]\par Office: [phone]\par Fax: [phone]\par \par [facility]:\par Department of Cardiovascular and Thoracic Surgery\par [address] [place] [address], [address]\par Office: [phone]\par Fax: [phone]\par \par Practice Manager: Ms. Dalia Spann\par Email: [email or web address]\par Phone: [phone]\par \par

## 2019-12-03 NOTE — H&P ADULT - ASSESSMENT
34 year old female with known anomalous RCA, consulted by Dr. Wooten for surgery. Dr. Wooten reviewed the indications for surgery, and used our webpage [email or web address] to illustrate the aorta and anatomy of the heart. He discussed the indications for surgery with the patient. Because the patient is displaying symptoms and at increased risk for sudden cardiac death, the patient meets the indications.     Dr. Wooten reviewed the CT, echocardiogram, and cardiac cath images with the patient and boyfriend and discussed the case with Dr. Wade Pappas. Dr. Wooten feels the patient will benefit and is a candidate for UNROOFING OF ANOMALOUS RCA, POSSIBLE CABG. Dr. Wooten discussed the risks, benefits and alternatives to surgery. Risks include but not limited to death, heart attack, bleeding, stroke, kidney problems, and infection. He quoted a 2-3% operative mortality and complication risk. All questions were addressed and patient presents today for elective surgery.     Plan  Stop birth control due to increased risk of hypercoagulopathy  3 soaps given  NPO after midnight  SDA- Transposition and unroofing of RCA, possible CABG

## 2019-12-03 NOTE — HISTORY OF PRESENT ILLNESS
[FreeTextEntry1] : 33 yo female, [facility] resident, with no significant PMHx, who began noticing decreasing exercise tolerance over the past 3 years.  She had previously been very active and was a regular runner, but began developing dyspnea and chest tightness that would limit her.  She was seen by a cardiologist 3 years ago and found to have some ectopic beats and was placed on a beta blocker without any symptomatic improvement.  Her symptoms continued to worsen to the point that she would get symptomatic after walking for 10 minutes.  She also reports occasional resting symptoms which would occur once or twice a week, but she feels that these resting symptoms have become less frequent in the past year, although her exertional symptoms have increased.  The exertional symptoms are also associated with lightheadedness, but she denies any episodes of syncope.  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  Patient presents today for a surgical consult. \par \par Of note, she reports her father was diagnosed with a similar coronary anomaly on routine screening when he was in his 50's, and was offered a surgical option but deferred 2/2 his age and minimal symptoms.  Her paternal grandfather also had a history of coronary disease although it is not known if this was related to anomalous coronary anatomy.  \par \par

## 2019-12-03 NOTE — ASSESSMENT
[FreeTextEntry1] : \par 34 year old female with known anomalous RCA, consulted by Dr. Wooten for surgery. Dr. Wooten reviewed the indications for surgery, and used our webpage [email or web address] to illustrate the aorta and anatomy of the heart. He discussed the indications for surgery with the patient. Because the patient is displaying symptoms and at increased risk for sudden cardiac death, the patient meets the indications. \par \par Dr. Wooten reviewed the CT, echocardiogram, and cardiac cath images with the patient and boyfriend and discussed the case with Dr. Wade Pappas. Dr. Wooten feels the patient will benefit and is a candidate for UNROOFING OF ANOMALOUS RCA, POSSIBLE CABG.  Dr. Wooten discussed the risks, benefits and alternatives to surgery. Risks include but not limited to death, heart attack, bleeding, stroke, kidney problems,  and infection. He quoted a 2-3% operative mortality and complication risk.  All questions were addressed and patient agrees to proceed with surgery.\par \par Plan\par Stop birth control\par 3 soaps given\par NPO after midnight\par SDA- Transposition and unroofing of RCA\par \par

## 2019-12-03 NOTE — H&P ADULT - NSHPSOCIALHISTORY_GEN_ALL_CORE
Non smoker   Never a smoker  Social alcohol use  Lives in [place]  Works as an   No illicit drug use

## 2019-12-03 NOTE — H&P ADULT - RS GEN PE MLT RESP DETAILS PC
clear to auscultation bilaterally/no intercostal retractions/no rales/no chest wall tenderness/no rhonchi/no wheezes

## 2019-12-03 NOTE — DATA REVIEWED
[FreeTextEntry1] : Cath 11/25/19: normal LMCA, normal LAD, normal LCx, RCA orgiinated from left coronary cusp and has a inter-arterial course.\par \par Echo 11/25/19: \par  1. The left ventricle is normal in size, wall thickness, and systolic function \par fraction of 65%. \par  2. The right ventricle is normal in size and systolic function. \par  3. There is trace mitral regurgitation. \par  4. There is trace tricuspid regurgitation. \par  5. There was insufficient tricuspid regurgitation detected from which to \par systolic pressure.\par \par Stress test 11/26/19: myocardial perfusion imaging normal.

## 2019-12-03 NOTE — H&P ADULT - NSHPLABSRESULTS_GEN_ALL_CORE
Hgb A1C =5.1  creat = 0.88  hct= 38.3  hgb= 12.5  plt= 260  WBC= 9.05  INR= 1.01  tot alb=4.4  tot bili=0.3    TSH= 0.892    pft:fev1 2.57, 96%pred value  Carotids: no significant stenosis    Chest xray: unremarkable  EKG:     Cath 11/25/19: normal LMCA, normal LAD, normal LCx, RCA orgiinated from left coronary cusp and has a inter-arterial course.    Echo 11/25/19:    1. The left ventricle is normal in size, wall thickness, and systolic function   fraction of 65%.    2. The right ventricle is normal in size and systolic function.    3. There is trace mitral regurgitation.    4. There is trace tricuspid regurgitation.    5. There was insufficient tricuspid regurgitation detected from which to   systolic pressure.    Stress test 11/26/19: myocardial perfusion imaging normal.

## 2019-12-05 ENCOUNTER — INPATIENT (INPATIENT)
Facility: HOSPITAL | Age: 34
LOS: 5 days | Discharge: HOME HEALTH | DRG: 252 | End: 2019-12-11
Attending: THORACIC SURGERY (CARDIOTHORACIC VASCULAR SURGERY) | Admitting: THORACIC SURGERY (CARDIOTHORACIC VASCULAR SURGERY)
Payer: COMMERCIAL

## 2019-12-05 ENCOUNTER — APPOINTMENT (OUTPATIENT)
Dept: CARDIOTHORACIC SURGERY | Facility: HOSPITAL | Age: 34
End: 2019-12-05
Payer: COMMERCIAL

## 2019-12-05 DIAGNOSIS — Z78.9 OTHER SPECIFIED HEALTH STATUS: ICD-10-CM

## 2019-12-05 DIAGNOSIS — Z98.890 OTHER SPECIFIED POSTPROCEDURAL STATES: Chronic | ICD-10-CM

## 2019-12-05 DIAGNOSIS — Z82.79 FAMILY HISTORY OF OTHER CONGENITAL MALFORMATIONS, DEFORMATIONS AND CHROMOSOMAL ABNORMALITIES: ICD-10-CM

## 2019-12-05 LAB
ALBUMIN SERPL ELPH-MCNC: 3.7 G/DL — SIGNIFICANT CHANGE UP (ref 3.3–5)
ALBUMIN SERPL ELPH-MCNC: 3.9 G/DL — SIGNIFICANT CHANGE UP (ref 3.3–5)
ALP SERPL-CCNC: 45 U/L — SIGNIFICANT CHANGE UP (ref 40–120)
ALP SERPL-CCNC: 49 U/L — SIGNIFICANT CHANGE UP (ref 40–120)
ALT FLD-CCNC: 28 U/L — SIGNIFICANT CHANGE UP (ref 10–45)
ALT FLD-CCNC: 30 U/L — SIGNIFICANT CHANGE UP (ref 10–45)
ANION GAP SERPL CALC-SCNC: 13 MMOL/L — SIGNIFICANT CHANGE UP (ref 5–17)
ANION GAP SERPL CALC-SCNC: 15 MMOL/L — SIGNIFICANT CHANGE UP (ref 5–17)
APTT BLD: 22.1 SEC — LOW (ref 27.5–36.3)
APTT BLD: 26.3 SEC — LOW (ref 27.5–36.3)
AST SERPL-CCNC: 43 U/L — HIGH (ref 10–40)
AST SERPL-CCNC: 49 U/L — HIGH (ref 10–40)
BASOPHILS # BLD AUTO: 0.04 K/UL — SIGNIFICANT CHANGE UP (ref 0–0.2)
BASOPHILS NFR BLD AUTO: 0.2 % — SIGNIFICANT CHANGE UP (ref 0–2)
BILIRUB SERPL-MCNC: 0.6 MG/DL — SIGNIFICANT CHANGE UP (ref 0.2–1.2)
BILIRUB SERPL-MCNC: 0.8 MG/DL — SIGNIFICANT CHANGE UP (ref 0.2–1.2)
BLD GP AB SCN SERPL QL: NEGATIVE — SIGNIFICANT CHANGE UP
BUN SERPL-MCNC: 7 MG/DL — SIGNIFICANT CHANGE UP (ref 7–23)
BUN SERPL-MCNC: 8 MG/DL — SIGNIFICANT CHANGE UP (ref 7–23)
CALCIUM SERPL-MCNC: 9 MG/DL — SIGNIFICANT CHANGE UP (ref 8.4–10.5)
CALCIUM SERPL-MCNC: 9.4 MG/DL — SIGNIFICANT CHANGE UP (ref 8.4–10.5)
CHLORIDE SERPL-SCNC: 107 MMOL/L — SIGNIFICANT CHANGE UP (ref 96–108)
CHLORIDE SERPL-SCNC: 108 MMOL/L — SIGNIFICANT CHANGE UP (ref 96–108)
CO2 SERPL-SCNC: 21 MMOL/L — LOW (ref 22–31)
CO2 SERPL-SCNC: 22 MMOL/L — SIGNIFICANT CHANGE UP (ref 22–31)
CREAT SERPL-MCNC: 0.65 MG/DL — SIGNIFICANT CHANGE UP (ref 0.5–1.3)
CREAT SERPL-MCNC: 0.73 MG/DL — SIGNIFICANT CHANGE UP (ref 0.5–1.3)
EOSINOPHIL # BLD AUTO: 0.01 K/UL — SIGNIFICANT CHANGE UP (ref 0–0.5)
EOSINOPHIL NFR BLD AUTO: 0 % — SIGNIFICANT CHANGE UP (ref 0–6)
GAS PNL BLDA: SIGNIFICANT CHANGE UP
GLUCOSE BLDC GLUCOMTR-MCNC: 138 MG/DL — HIGH (ref 70–99)
GLUCOSE BLDC GLUCOMTR-MCNC: 160 MG/DL — HIGH (ref 70–99)
GLUCOSE BLDC GLUCOMTR-MCNC: 171 MG/DL — HIGH (ref 70–99)
GLUCOSE SERPL-MCNC: 153 MG/DL — HIGH (ref 70–99)
GLUCOSE SERPL-MCNC: 171 MG/DL — HIGH (ref 70–99)
HCG SERPL-ACNC: <0 MIU/ML — SIGNIFICANT CHANGE UP
HCT VFR BLD CALC: 28.4 % — LOW (ref 34.5–45)
HCT VFR BLD CALC: 28.6 % — LOW (ref 34.5–45)
HGB BLD-MCNC: 10 G/DL — LOW (ref 11.5–15.5)
HGB BLD-MCNC: 9.6 G/DL — LOW (ref 11.5–15.5)
IMM GRANULOCYTES NFR BLD AUTO: 0.9 % — SIGNIFICANT CHANGE UP (ref 0–1.5)
INR BLD: 1.17 — HIGH (ref 0.88–1.16)
INR BLD: 1.21 — HIGH (ref 0.88–1.16)
LACTATE SERPL-SCNC: 2.5 MMOL/L — HIGH (ref 0.5–2)
LACTATE SERPL-SCNC: 2.6 MMOL/L — HIGH (ref 0.5–2)
LYMPHOCYTES # BLD AUTO: 1.36 K/UL — SIGNIFICANT CHANGE UP (ref 1–3.3)
LYMPHOCYTES # BLD AUTO: 6.2 % — LOW (ref 13–44)
MAGNESIUM SERPL-MCNC: 2.4 MG/DL — SIGNIFICANT CHANGE UP (ref 1.6–2.6)
MCHC RBC-ENTMCNC: 30.5 PG — SIGNIFICANT CHANGE UP (ref 27–34)
MCHC RBC-ENTMCNC: 31 PG — SIGNIFICANT CHANGE UP (ref 27–34)
MCHC RBC-ENTMCNC: 33.8 GM/DL — SIGNIFICANT CHANGE UP (ref 32–36)
MCHC RBC-ENTMCNC: 35 GM/DL — SIGNIFICANT CHANGE UP (ref 32–36)
MCV RBC AUTO: 88.5 FL — SIGNIFICANT CHANGE UP (ref 80–100)
MCV RBC AUTO: 90.2 FL — SIGNIFICANT CHANGE UP (ref 80–100)
MONOCYTES # BLD AUTO: 0.68 K/UL — SIGNIFICANT CHANGE UP (ref 0–0.9)
MONOCYTES NFR BLD AUTO: 3.1 % — SIGNIFICANT CHANGE UP (ref 2–14)
NEUTROPHILS # BLD AUTO: 19.76 K/UL — HIGH (ref 1.8–7.4)
NEUTROPHILS NFR BLD AUTO: 89.6 % — HIGH (ref 43–77)
NRBC # BLD: 0 /100 WBCS — SIGNIFICANT CHANGE UP (ref 0–0)
NRBC # BLD: 0 /100 WBCS — SIGNIFICANT CHANGE UP (ref 0–0)
PHOSPHATE SERPL-MCNC: 3 MG/DL — SIGNIFICANT CHANGE UP (ref 2.5–4.5)
PLATELET # BLD AUTO: 221 K/UL — SIGNIFICANT CHANGE UP (ref 150–400)
PLATELET # BLD AUTO: 233 K/UL — SIGNIFICANT CHANGE UP (ref 150–400)
POTASSIUM SERPL-MCNC: 4.2 MMOL/L — SIGNIFICANT CHANGE UP (ref 3.5–5.3)
POTASSIUM SERPL-MCNC: 4.5 MMOL/L — SIGNIFICANT CHANGE UP (ref 3.5–5.3)
POTASSIUM SERPL-SCNC: 4.2 MMOL/L — SIGNIFICANT CHANGE UP (ref 3.5–5.3)
POTASSIUM SERPL-SCNC: 4.5 MMOL/L — SIGNIFICANT CHANGE UP (ref 3.5–5.3)
PROT SERPL-MCNC: 5.8 G/DL — LOW (ref 6–8.3)
PROT SERPL-MCNC: 6 G/DL — SIGNIFICANT CHANGE UP (ref 6–8.3)
PROTHROM AB SERPL-ACNC: 13.3 SEC — HIGH (ref 10–12.9)
PROTHROM AB SERPL-ACNC: 13.7 SEC — HIGH (ref 10–12.9)
RBC # BLD: 3.15 M/UL — LOW (ref 3.8–5.2)
RBC # BLD: 3.23 M/UL — LOW (ref 3.8–5.2)
RBC # FLD: 12.5 % — SIGNIFICANT CHANGE UP (ref 10.3–14.5)
RBC # FLD: 12.6 % — SIGNIFICANT CHANGE UP (ref 10.3–14.5)
RH IG SCN BLD-IMP: POSITIVE — SIGNIFICANT CHANGE UP
SODIUM SERPL-SCNC: 143 MMOL/L — SIGNIFICANT CHANGE UP (ref 135–145)
SODIUM SERPL-SCNC: 143 MMOL/L — SIGNIFICANT CHANGE UP (ref 135–145)
WBC # BLD: 22.01 K/UL — HIGH (ref 3.8–10.5)
WBC # BLD: 22.05 K/UL — HIGH (ref 3.8–10.5)
WBC # FLD AUTO: 22.01 K/UL — HIGH (ref 3.8–10.5)
WBC # FLD AUTO: 22.05 K/UL — HIGH (ref 3.8–10.5)

## 2019-12-05 PROCEDURE — 99291 CRITICAL CARE FIRST HOUR: CPT

## 2019-12-05 PROCEDURE — 33507 REPAIR ART INTRAMURAL: CPT | Mod: 80

## 2019-12-05 PROCEDURE — 99292 CRITICAL CARE ADDL 30 MIN: CPT | Mod: 25

## 2019-12-05 PROCEDURE — 71045 X-RAY EXAM CHEST 1 VIEW: CPT | Mod: 26

## 2019-12-05 PROCEDURE — 33507 REPAIR ART INTRAMURAL: CPT

## 2019-12-05 RX ORDER — CEFAZOLIN SODIUM 1 G
2000 VIAL (EA) INJECTION EVERY 8 HOURS
Refills: 0 | Status: DISCONTINUED | OUTPATIENT
Start: 2019-12-05 | End: 2019-12-05

## 2019-12-05 RX ORDER — ALBUMIN HUMAN 25 %
250 VIAL (ML) INTRAVENOUS
Refills: 0 | Status: COMPLETED | OUTPATIENT
Start: 2019-12-05 | End: 2019-12-05

## 2019-12-05 RX ORDER — FENTANYL CITRATE 50 UG/ML
25 INJECTION INTRAVENOUS ONCE
Refills: 0 | Status: DISCONTINUED | OUTPATIENT
Start: 2019-12-05 | End: 2019-12-05

## 2019-12-05 RX ORDER — ACETAMINOPHEN 500 MG
1000 TABLET ORAL ONCE
Refills: 0 | Status: COMPLETED | OUTPATIENT
Start: 2019-12-05 | End: 2019-12-05

## 2019-12-05 RX ORDER — DEXTROSE 50 % IN WATER 50 %
25 SYRINGE (ML) INTRAVENOUS
Refills: 0 | Status: DISCONTINUED | OUTPATIENT
Start: 2019-12-05 | End: 2019-12-06

## 2019-12-05 RX ORDER — CEFAZOLIN SODIUM 1 G
2000 VIAL (EA) INJECTION EVERY 8 HOURS
Refills: 0 | Status: COMPLETED | OUTPATIENT
Start: 2019-12-05 | End: 2019-12-07

## 2019-12-05 RX ORDER — INFLUENZA VIRUS VACCINE 15; 15; 15; 15 UG/.5ML; UG/.5ML; UG/.5ML; UG/.5ML
0.5 SUSPENSION INTRAMUSCULAR ONCE
Refills: 0 | Status: DISCONTINUED | OUTPATIENT
Start: 2019-12-05 | End: 2019-12-11

## 2019-12-05 RX ORDER — SODIUM CHLORIDE 9 MG/ML
1000 INJECTION INTRAMUSCULAR; INTRAVENOUS; SUBCUTANEOUS
Refills: 0 | Status: DISCONTINUED | OUTPATIENT
Start: 2019-12-05 | End: 2019-12-09

## 2019-12-05 RX ORDER — FENTANYL CITRATE 50 UG/ML
25 INJECTION INTRAVENOUS
Refills: 0 | Status: DISCONTINUED | OUTPATIENT
Start: 2019-12-05 | End: 2019-12-06

## 2019-12-05 RX ORDER — DEXTROSE 50 % IN WATER 50 %
50 SYRINGE (ML) INTRAVENOUS
Refills: 0 | Status: DISCONTINUED | OUTPATIENT
Start: 2019-12-05 | End: 2019-12-06

## 2019-12-05 RX ORDER — INSULIN HUMAN 100 [IU]/ML
2 INJECTION, SOLUTION SUBCUTANEOUS
Qty: 50 | Refills: 0 | Status: DISCONTINUED | OUTPATIENT
Start: 2019-12-05 | End: 2019-12-06

## 2019-12-05 RX ORDER — CHLORHEXIDINE GLUCONATE 213 G/1000ML
15 SOLUTION TOPICAL EVERY 12 HOURS
Refills: 0 | Status: DISCONTINUED | OUTPATIENT
Start: 2019-12-05 | End: 2019-12-06

## 2019-12-05 RX ORDER — HEPARIN SODIUM 5000 [USP'U]/ML
5000 INJECTION INTRAVENOUS; SUBCUTANEOUS EVERY 8 HOURS
Refills: 0 | Status: DISCONTINUED | OUTPATIENT
Start: 2019-12-05 | End: 2019-12-11

## 2019-12-05 RX ORDER — FAMOTIDINE 10 MG/ML
20 INJECTION INTRAVENOUS DAILY
Refills: 0 | Status: DISCONTINUED | OUTPATIENT
Start: 2019-12-05 | End: 2019-12-06

## 2019-12-05 RX ORDER — PROPOFOL 10 MG/ML
10 INJECTION, EMULSION INTRAVENOUS
Qty: 1000 | Refills: 0 | Status: DISCONTINUED | OUTPATIENT
Start: 2019-12-05 | End: 2019-12-06

## 2019-12-05 RX ORDER — DEXMEDETOMIDINE HYDROCHLORIDE IN 0.9% SODIUM CHLORIDE 4 UG/ML
0.2 INJECTION INTRAVENOUS
Qty: 200 | Refills: 0 | Status: DISCONTINUED | OUTPATIENT
Start: 2019-12-05 | End: 2019-12-06

## 2019-12-05 RX ORDER — ASPIRIN/CALCIUM CARB/MAGNESIUM 324 MG
81 TABLET ORAL DAILY
Refills: 0 | Status: DISCONTINUED | OUTPATIENT
Start: 2019-12-05 | End: 2019-12-11

## 2019-12-05 RX ADMIN — Medication 0.5 MILLIGRAM(S): at 12:15

## 2019-12-05 RX ADMIN — FENTANYL CITRATE 25 MICROGRAM(S): 50 INJECTION INTRAVENOUS at 22:45

## 2019-12-05 RX ADMIN — FAMOTIDINE 20 MILLIGRAM(S): 10 INJECTION INTRAVENOUS at 22:52

## 2019-12-05 RX ADMIN — PROPOFOL 3.65 MICROGRAM(S)/KG/MIN: 10 INJECTION, EMULSION INTRAVENOUS at 21:00

## 2019-12-05 RX ADMIN — Medication 1000 MILLIGRAM(S): at 21:20

## 2019-12-05 RX ADMIN — SODIUM CHLORIDE 10 MILLILITER(S): 9 INJECTION INTRAMUSCULAR; INTRAVENOUS; SUBCUTANEOUS at 19:03

## 2019-12-05 RX ADMIN — INSULIN HUMAN 2 UNIT(S)/HR: 100 INJECTION, SOLUTION SUBCUTANEOUS at 20:01

## 2019-12-05 RX ADMIN — Medication 333.33 MILLILITER(S): at 22:30

## 2019-12-05 RX ADMIN — Medication 400 MILLIGRAM(S): at 21:00

## 2019-12-05 RX ADMIN — FENTANYL CITRATE 25 MICROGRAM(S): 50 INJECTION INTRAVENOUS at 22:30

## 2019-12-05 RX ADMIN — HEPARIN SODIUM 5000 UNIT(S): 5000 INJECTION INTRAVENOUS; SUBCUTANEOUS at 22:53

## 2019-12-05 RX ADMIN — FENTANYL CITRATE 25 MICROGRAM(S): 50 INJECTION INTRAVENOUS at 21:45

## 2019-12-05 RX ADMIN — FENTANYL CITRATE 25 MICROGRAM(S): 50 INJECTION INTRAVENOUS at 20:00

## 2019-12-05 RX ADMIN — FENTANYL CITRATE 25 MICROGRAM(S): 50 INJECTION INTRAVENOUS at 22:00

## 2019-12-05 RX ADMIN — Medication 333.33 MILLILITER(S): at 21:30

## 2019-12-05 RX ADMIN — FENTANYL CITRATE 25 MICROGRAM(S): 50 INJECTION INTRAVENOUS at 20:01

## 2019-12-05 NOTE — BRIEF OPERATIVE NOTE - NSICDXBRIEFPROCEDURE_GEN_ALL_CORE_FT
PROCEDURES:  Repair, anomalous right coronary artery 05-Dec-2019 18:05:10 transposition of right coronay artery Luna Bryan

## 2019-12-05 NOTE — CHART NOTE - NSCHARTNOTEFT_GEN_A_CORE
Dr. Staley was the first assistant for this case including but not limited to opening, cannulation, unroofing of the coronary artery, decannulation, and chest closure.    I was present for this procedure and participated as first assistant as described by the PA above, unless otherwise noted below.

## 2019-12-06 ENCOUNTER — CHART COPY (OUTPATIENT)
Age: 34
End: 2019-12-06

## 2019-12-06 LAB
ALBUMIN SERPL ELPH-MCNC: 4 G/DL — SIGNIFICANT CHANGE UP (ref 3.3–5)
ALBUMIN SERPL ELPH-MCNC: 4.2 G/DL — SIGNIFICANT CHANGE UP (ref 3.3–5)
ALBUMIN SERPL ELPH-MCNC: 4.4 G/DL — SIGNIFICANT CHANGE UP (ref 3.3–5)
ALP SERPL-CCNC: 40 U/L — SIGNIFICANT CHANGE UP (ref 40–120)
ALP SERPL-CCNC: 42 U/L — SIGNIFICANT CHANGE UP (ref 40–120)
ALP SERPL-CCNC: 44 U/L — SIGNIFICANT CHANGE UP (ref 40–120)
ALT FLD-CCNC: 20 U/L — SIGNIFICANT CHANGE UP (ref 10–45)
ALT FLD-CCNC: 22 U/L — SIGNIFICANT CHANGE UP (ref 10–45)
ALT FLD-CCNC: 25 U/L — SIGNIFICANT CHANGE UP (ref 10–45)
ANION GAP SERPL CALC-SCNC: 11 MMOL/L — SIGNIFICANT CHANGE UP (ref 5–17)
ANION GAP SERPL CALC-SCNC: 13 MMOL/L — SIGNIFICANT CHANGE UP (ref 5–17)
ANION GAP SERPL CALC-SCNC: 9 MMOL/L — SIGNIFICANT CHANGE UP (ref 5–17)
APTT BLD: 16.6 SEC — LOW (ref 27.5–36.3)
APTT BLD: 24 SEC — LOW (ref 27.5–36.3)
APTT BLD: 58 SEC — HIGH (ref 27.5–36.3)
AST SERPL-CCNC: 29 U/L — SIGNIFICANT CHANGE UP (ref 10–40)
AST SERPL-CCNC: 33 U/L — SIGNIFICANT CHANGE UP (ref 10–40)
AST SERPL-CCNC: 38 U/L — SIGNIFICANT CHANGE UP (ref 10–40)
BILIRUB SERPL-MCNC: 0.4 MG/DL — SIGNIFICANT CHANGE UP (ref 0.2–1.2)
BILIRUB SERPL-MCNC: 0.5 MG/DL — SIGNIFICANT CHANGE UP (ref 0.2–1.2)
BILIRUB SERPL-MCNC: 0.5 MG/DL — SIGNIFICANT CHANGE UP (ref 0.2–1.2)
BUN SERPL-MCNC: 10 MG/DL — SIGNIFICANT CHANGE UP (ref 7–23)
BUN SERPL-MCNC: 10 MG/DL — SIGNIFICANT CHANGE UP (ref 7–23)
BUN SERPL-MCNC: 9 MG/DL — SIGNIFICANT CHANGE UP (ref 7–23)
CALCIUM SERPL-MCNC: 8.6 MG/DL — SIGNIFICANT CHANGE UP (ref 8.4–10.5)
CALCIUM SERPL-MCNC: 8.7 MG/DL — SIGNIFICANT CHANGE UP (ref 8.4–10.5)
CALCIUM SERPL-MCNC: 8.8 MG/DL — SIGNIFICANT CHANGE UP (ref 8.4–10.5)
CHLORIDE SERPL-SCNC: 103 MMOL/L — SIGNIFICANT CHANGE UP (ref 96–108)
CHLORIDE SERPL-SCNC: 104 MMOL/L — SIGNIFICANT CHANGE UP (ref 96–108)
CHLORIDE SERPL-SCNC: 104 MMOL/L — SIGNIFICANT CHANGE UP (ref 96–108)
CO2 SERPL-SCNC: 23 MMOL/L — SIGNIFICANT CHANGE UP (ref 22–31)
CO2 SERPL-SCNC: 24 MMOL/L — SIGNIFICANT CHANGE UP (ref 22–31)
CO2 SERPL-SCNC: 26 MMOL/L — SIGNIFICANT CHANGE UP (ref 22–31)
CREAT SERPL-MCNC: 0.67 MG/DL — SIGNIFICANT CHANGE UP (ref 0.5–1.3)
CREAT SERPL-MCNC: 0.69 MG/DL — SIGNIFICANT CHANGE UP (ref 0.5–1.3)
CREAT SERPL-MCNC: 0.74 MG/DL — SIGNIFICANT CHANGE UP (ref 0.5–1.3)
GAS PNL BLDA: SIGNIFICANT CHANGE UP
GAS PNL BLDA: SIGNIFICANT CHANGE UP
GAS PNL BLDV: SIGNIFICANT CHANGE UP
GLUCOSE BLDC GLUCOMTR-MCNC: 133 MG/DL — HIGH (ref 70–99)
GLUCOSE BLDC GLUCOMTR-MCNC: 137 MG/DL — HIGH (ref 70–99)
GLUCOSE BLDC GLUCOMTR-MCNC: 149 MG/DL — HIGH (ref 70–99)
GLUCOSE BLDC GLUCOMTR-MCNC: 153 MG/DL — HIGH (ref 70–99)
GLUCOSE BLDC GLUCOMTR-MCNC: 184 MG/DL — HIGH (ref 70–99)
GLUCOSE SERPL-MCNC: 144 MG/DL — HIGH (ref 70–99)
GLUCOSE SERPL-MCNC: 181 MG/DL — HIGH (ref 70–99)
GLUCOSE SERPL-MCNC: 182 MG/DL — HIGH (ref 70–99)
HCT VFR BLD CALC: 23.7 % — LOW (ref 34.5–45)
HCT VFR BLD CALC: 26.3 % — LOW (ref 34.5–45)
HCT VFR BLD CALC: 27.8 % — LOW (ref 34.5–45)
HGB BLD-MCNC: 7.7 G/DL — LOW (ref 11.5–15.5)
HGB BLD-MCNC: 8.7 G/DL — LOW (ref 11.5–15.5)
HGB BLD-MCNC: 9.2 G/DL — LOW (ref 11.5–15.5)
INR BLD: 1.15 — SIGNIFICANT CHANGE UP (ref 0.88–1.16)
INR BLD: 1.23 — HIGH (ref 0.88–1.16)
INR BLD: 1.31 — HIGH (ref 0.88–1.16)
LACTATE SERPL-SCNC: 1.5 MMOL/L — SIGNIFICANT CHANGE UP (ref 0.5–2)
LACTATE SERPL-SCNC: 2.1 MMOL/L — HIGH (ref 0.5–2)
LACTATE SERPL-SCNC: 3.2 MMOL/L — HIGH (ref 0.5–2)
MAGNESIUM SERPL-MCNC: 2.1 MG/DL — SIGNIFICANT CHANGE UP (ref 1.6–2.6)
MAGNESIUM SERPL-MCNC: 2.2 MG/DL — SIGNIFICANT CHANGE UP (ref 1.6–2.6)
MAGNESIUM SERPL-MCNC: 2.2 MG/DL — SIGNIFICANT CHANGE UP (ref 1.6–2.6)
MCHC RBC-ENTMCNC: 30.2 PG — SIGNIFICANT CHANGE UP (ref 27–34)
MCHC RBC-ENTMCNC: 30.4 PG — SIGNIFICANT CHANGE UP (ref 27–34)
MCHC RBC-ENTMCNC: 30.6 PG — SIGNIFICANT CHANGE UP (ref 27–34)
MCHC RBC-ENTMCNC: 32.5 GM/DL — SIGNIFICANT CHANGE UP (ref 32–36)
MCHC RBC-ENTMCNC: 33.1 GM/DL — SIGNIFICANT CHANGE UP (ref 32–36)
MCHC RBC-ENTMCNC: 33.1 GM/DL — SIGNIFICANT CHANGE UP (ref 32–36)
MCV RBC AUTO: 92 FL — SIGNIFICANT CHANGE UP (ref 80–100)
MCV RBC AUTO: 92.4 FL — SIGNIFICANT CHANGE UP (ref 80–100)
MCV RBC AUTO: 92.9 FL — SIGNIFICANT CHANGE UP (ref 80–100)
NRBC # BLD: 0 /100 WBCS — SIGNIFICANT CHANGE UP (ref 0–0)
PHOSPHATE SERPL-MCNC: 2.1 MG/DL — LOW (ref 2.5–4.5)
PHOSPHATE SERPL-MCNC: 2.7 MG/DL — SIGNIFICANT CHANGE UP (ref 2.5–4.5)
PHOSPHATE SERPL-MCNC: 3.8 MG/DL — SIGNIFICANT CHANGE UP (ref 2.5–4.5)
PLATELET # BLD AUTO: 179 K/UL — SIGNIFICANT CHANGE UP (ref 150–400)
PLATELET # BLD AUTO: 207 K/UL — SIGNIFICANT CHANGE UP (ref 150–400)
PLATELET # BLD AUTO: 211 K/UL — SIGNIFICANT CHANGE UP (ref 150–400)
POTASSIUM SERPL-MCNC: 4.2 MMOL/L — SIGNIFICANT CHANGE UP (ref 3.5–5.3)
POTASSIUM SERPL-MCNC: 4.4 MMOL/L — SIGNIFICANT CHANGE UP (ref 3.5–5.3)
POTASSIUM SERPL-MCNC: 5.2 MMOL/L — SIGNIFICANT CHANGE UP (ref 3.5–5.3)
POTASSIUM SERPL-SCNC: 4.2 MMOL/L — SIGNIFICANT CHANGE UP (ref 3.5–5.3)
POTASSIUM SERPL-SCNC: 4.4 MMOL/L — SIGNIFICANT CHANGE UP (ref 3.5–5.3)
POTASSIUM SERPL-SCNC: 5.2 MMOL/L — SIGNIFICANT CHANGE UP (ref 3.5–5.3)
PROT SERPL-MCNC: 5.7 G/DL — LOW (ref 6–8.3)
PROT SERPL-MCNC: 6.2 G/DL — SIGNIFICANT CHANGE UP (ref 6–8.3)
PROT SERPL-MCNC: 6.5 G/DL — SIGNIFICANT CHANGE UP (ref 6–8.3)
PROTHROM AB SERPL-ACNC: 13 SEC — HIGH (ref 10–12.9)
PROTHROM AB SERPL-ACNC: 14 SEC — HIGH (ref 10–12.9)
PROTHROM AB SERPL-ACNC: 14.9 SEC — HIGH (ref 10–12.9)
RBC # BLD: 2.55 M/UL — LOW (ref 3.8–5.2)
RBC # BLD: 2.86 M/UL — LOW (ref 3.8–5.2)
RBC # BLD: 3.01 M/UL — LOW (ref 3.8–5.2)
RBC # FLD: 13.1 % — SIGNIFICANT CHANGE UP (ref 10.3–14.5)
RBC # FLD: 13.2 % — SIGNIFICANT CHANGE UP (ref 10.3–14.5)
RBC # FLD: 13.2 % — SIGNIFICANT CHANGE UP (ref 10.3–14.5)
SODIUM SERPL-SCNC: 138 MMOL/L — SIGNIFICANT CHANGE UP (ref 135–145)
SODIUM SERPL-SCNC: 139 MMOL/L — SIGNIFICANT CHANGE UP (ref 135–145)
SODIUM SERPL-SCNC: 140 MMOL/L — SIGNIFICANT CHANGE UP (ref 135–145)
WBC # BLD: 18.26 K/UL — HIGH (ref 3.8–10.5)
WBC # BLD: 20.45 K/UL — HIGH (ref 3.8–10.5)
WBC # BLD: 20.98 K/UL — HIGH (ref 3.8–10.5)
WBC # FLD AUTO: 18.26 K/UL — HIGH (ref 3.8–10.5)
WBC # FLD AUTO: 20.45 K/UL — HIGH (ref 3.8–10.5)
WBC # FLD AUTO: 20.98 K/UL — HIGH (ref 3.8–10.5)

## 2019-12-06 PROCEDURE — 71045 X-RAY EXAM CHEST 1 VIEW: CPT | Mod: 26,77

## 2019-12-06 PROCEDURE — 70496 CT ANGIOGRAPHY HEAD: CPT | Mod: 26

## 2019-12-06 PROCEDURE — ZZZZZ: CPT

## 2019-12-06 PROCEDURE — 99221 1ST HOSP IP/OBS SF/LOW 40: CPT

## 2019-12-06 PROCEDURE — 71045 X-RAY EXAM CHEST 1 VIEW: CPT | Mod: 26

## 2019-12-06 PROCEDURE — 99292 CRITICAL CARE ADDL 30 MIN: CPT

## 2019-12-06 PROCEDURE — 99291 CRITICAL CARE FIRST HOUR: CPT

## 2019-12-06 PROCEDURE — 70498 CT ANGIOGRAPHY NECK: CPT | Mod: 26

## 2019-12-06 PROCEDURE — 70450 CT HEAD/BRAIN W/O DYE: CPT | Mod: 26,59

## 2019-12-06 PROCEDURE — 36620 INSERTION CATHETER ARTERY: CPT

## 2019-12-06 PROCEDURE — 0042T: CPT

## 2019-12-06 RX ORDER — FUROSEMIDE 40 MG
20 TABLET ORAL
Refills: 0 | Status: DISCONTINUED | OUTPATIENT
Start: 2019-12-06 | End: 2019-12-07

## 2019-12-06 RX ORDER — PANTOPRAZOLE SODIUM 20 MG/1
40 TABLET, DELAYED RELEASE ORAL
Refills: 0 | Status: DISCONTINUED | OUTPATIENT
Start: 2019-12-06 | End: 2019-12-11

## 2019-12-06 RX ORDER — DEXTROSE 50 % IN WATER 50 %
25 SYRINGE (ML) INTRAVENOUS ONCE
Refills: 0 | Status: DISCONTINUED | OUTPATIENT
Start: 2019-12-06 | End: 2019-12-08

## 2019-12-06 RX ORDER — METOCLOPRAMIDE HCL 10 MG
10 TABLET ORAL ONCE
Refills: 0 | Status: COMPLETED | OUTPATIENT
Start: 2019-12-06 | End: 2019-12-06

## 2019-12-06 RX ORDER — DEXTROSE 50 % IN WATER 50 %
12.5 SYRINGE (ML) INTRAVENOUS ONCE
Refills: 0 | Status: DISCONTINUED | OUTPATIENT
Start: 2019-12-06 | End: 2019-12-08

## 2019-12-06 RX ORDER — ACETAMINOPHEN 500 MG
1000 TABLET ORAL ONCE
Refills: 0 | Status: COMPLETED | OUTPATIENT
Start: 2019-12-07 | End: 2019-12-07

## 2019-12-06 RX ORDER — ACETAMINOPHEN 500 MG
1000 TABLET ORAL ONCE
Refills: 0 | Status: COMPLETED | OUTPATIENT
Start: 2019-12-06 | End: 2019-12-06

## 2019-12-06 RX ORDER — HYDROMORPHONE HYDROCHLORIDE 2 MG/ML
0.5 INJECTION INTRAMUSCULAR; INTRAVENOUS; SUBCUTANEOUS ONCE
Refills: 0 | Status: DISCONTINUED | OUTPATIENT
Start: 2019-12-06 | End: 2019-12-06

## 2019-12-06 RX ORDER — METOPROLOL TARTRATE 50 MG
12.5 TABLET ORAL ONCE
Refills: 0 | Status: COMPLETED | OUTPATIENT
Start: 2019-12-06 | End: 2019-12-06

## 2019-12-06 RX ORDER — DEXTROSE 50 % IN WATER 50 %
15 SYRINGE (ML) INTRAVENOUS ONCE
Refills: 0 | Status: DISCONTINUED | OUTPATIENT
Start: 2019-12-06 | End: 2019-12-08

## 2019-12-06 RX ORDER — SIMETHICONE 80 MG/1
80 TABLET, CHEWABLE ORAL ONCE
Refills: 0 | Status: COMPLETED | OUTPATIENT
Start: 2019-12-06 | End: 2019-12-06

## 2019-12-06 RX ORDER — ALBUMIN HUMAN 25 %
250 VIAL (ML) INTRAVENOUS
Refills: 0 | Status: COMPLETED | OUTPATIENT
Start: 2019-12-06 | End: 2019-12-06

## 2019-12-06 RX ORDER — GLUCAGON INJECTION, SOLUTION 0.5 MG/.1ML
1 INJECTION, SOLUTION SUBCUTANEOUS ONCE
Refills: 0 | Status: DISCONTINUED | OUTPATIENT
Start: 2019-12-06 | End: 2019-12-11

## 2019-12-06 RX ORDER — POLYETHYLENE GLYCOL 3350 17 G/17G
17 POWDER, FOR SOLUTION ORAL DAILY
Refills: 0 | Status: DISCONTINUED | OUTPATIENT
Start: 2019-12-06 | End: 2019-12-11

## 2019-12-06 RX ORDER — OXYCODONE AND ACETAMINOPHEN 5; 325 MG/1; MG/1
1 TABLET ORAL EVERY 6 HOURS
Refills: 0 | Status: DISCONTINUED | OUTPATIENT
Start: 2019-12-06 | End: 2019-12-06

## 2019-12-06 RX ORDER — SODIUM CHLORIDE 9 MG/ML
1000 INJECTION, SOLUTION INTRAVENOUS ONCE
Refills: 0 | Status: COMPLETED | OUTPATIENT
Start: 2019-12-06 | End: 2019-12-06

## 2019-12-06 RX ORDER — SODIUM CHLORIDE 9 MG/ML
1000 INJECTION, SOLUTION INTRAVENOUS
Refills: 0 | Status: DISCONTINUED | OUTPATIENT
Start: 2019-12-06 | End: 2019-12-08

## 2019-12-06 RX ORDER — CLOPIDOGREL BISULFATE 75 MG/1
75 TABLET, FILM COATED ORAL DAILY
Refills: 0 | Status: DISCONTINUED | OUTPATIENT
Start: 2019-12-06 | End: 2019-12-11

## 2019-12-06 RX ORDER — ONDANSETRON 8 MG/1
4 TABLET, FILM COATED ORAL ONCE
Refills: 0 | Status: COMPLETED | OUTPATIENT
Start: 2019-12-06 | End: 2019-12-06

## 2019-12-06 RX ORDER — OXYCODONE AND ACETAMINOPHEN 5; 325 MG/1; MG/1
2 TABLET ORAL EVERY 6 HOURS
Refills: 0 | Status: DISCONTINUED | OUTPATIENT
Start: 2019-12-06 | End: 2019-12-06

## 2019-12-06 RX ORDER — LIDOCAINE 4 G/100G
1 CREAM TOPICAL ONCE
Refills: 0 | Status: COMPLETED | OUTPATIENT
Start: 2019-12-06 | End: 2019-12-06

## 2019-12-06 RX ORDER — ATORVASTATIN CALCIUM 80 MG/1
80 TABLET, FILM COATED ORAL AT BEDTIME
Refills: 0 | Status: DISCONTINUED | OUTPATIENT
Start: 2019-12-06 | End: 2019-12-11

## 2019-12-06 RX ORDER — INSULIN LISPRO 100/ML
VIAL (ML) SUBCUTANEOUS
Refills: 0 | Status: DISCONTINUED | OUTPATIENT
Start: 2019-12-06 | End: 2019-12-08

## 2019-12-06 RX ADMIN — SIMETHICONE 80 MILLIGRAM(S): 80 TABLET, CHEWABLE ORAL at 02:30

## 2019-12-06 RX ADMIN — Medication 400 MILLIGRAM(S): at 14:00

## 2019-12-06 RX ADMIN — Medication 2: at 21:32

## 2019-12-06 RX ADMIN — Medication 2000 MILLIGRAM(S): at 16:53

## 2019-12-06 RX ADMIN — Medication 400 MILLIGRAM(S): at 19:17

## 2019-12-06 RX ADMIN — Medication 20 MILLIGRAM(S): at 22:32

## 2019-12-06 RX ADMIN — OXYCODONE AND ACETAMINOPHEN 2 TABLET(S): 5; 325 TABLET ORAL at 04:00

## 2019-12-06 RX ADMIN — FENTANYL CITRATE 25 MICROGRAM(S): 50 INJECTION INTRAVENOUS at 06:15

## 2019-12-06 RX ADMIN — Medication 125 MILLILITER(S): at 14:30

## 2019-12-06 RX ADMIN — LIDOCAINE 1 PATCH: 4 CREAM TOPICAL at 23:43

## 2019-12-06 RX ADMIN — CLOPIDOGREL BISULFATE 75 MILLIGRAM(S): 75 TABLET, FILM COATED ORAL at 19:48

## 2019-12-06 RX ADMIN — POLYETHYLENE GLYCOL 3350 17 GRAM(S): 17 POWDER, FOR SOLUTION ORAL at 11:16

## 2019-12-06 RX ADMIN — Medication 2000 MILLIGRAM(S): at 00:28

## 2019-12-06 RX ADMIN — HEPARIN SODIUM 5000 UNIT(S): 5000 INJECTION INTRAVENOUS; SUBCUTANEOUS at 06:54

## 2019-12-06 RX ADMIN — HYDROMORPHONE HYDROCHLORIDE 0.5 MILLIGRAM(S): 2 INJECTION INTRAMUSCULAR; INTRAVENOUS; SUBCUTANEOUS at 00:15

## 2019-12-06 RX ADMIN — Medication 81 MILLIGRAM(S): at 11:16

## 2019-12-06 RX ADMIN — FENTANYL CITRATE 25 MICROGRAM(S): 50 INJECTION INTRAVENOUS at 06:00

## 2019-12-06 RX ADMIN — Medication 2: at 08:03

## 2019-12-06 RX ADMIN — Medication 1000 MILLIGRAM(S): at 19:34

## 2019-12-06 RX ADMIN — Medication 2000 MILLIGRAM(S): at 10:00

## 2019-12-06 RX ADMIN — ATORVASTATIN CALCIUM 80 MILLIGRAM(S): 80 TABLET, FILM COATED ORAL at 21:31

## 2019-12-06 RX ADMIN — Medication 1000 MILLIGRAM(S): at 14:44

## 2019-12-06 RX ADMIN — SODIUM CHLORIDE 500 MILLILITER(S): 9 INJECTION, SOLUTION INTRAVENOUS at 08:00

## 2019-12-06 RX ADMIN — OXYCODONE AND ACETAMINOPHEN 2 TABLET(S): 5; 325 TABLET ORAL at 03:00

## 2019-12-06 RX ADMIN — FENTANYL CITRATE 25 MICROGRAM(S): 50 INJECTION INTRAVENOUS at 01:20

## 2019-12-06 RX ADMIN — PANTOPRAZOLE SODIUM 40 MILLIGRAM(S): 20 TABLET, DELAYED RELEASE ORAL at 06:54

## 2019-12-06 RX ADMIN — FENTANYL CITRATE 25 MICROGRAM(S): 50 INJECTION INTRAVENOUS at 02:46

## 2019-12-06 RX ADMIN — ONDANSETRON 4 MILLIGRAM(S): 8 TABLET, FILM COATED ORAL at 08:48

## 2019-12-06 RX ADMIN — HEPARIN SODIUM 5000 UNIT(S): 5000 INJECTION INTRAVENOUS; SUBCUTANEOUS at 13:22

## 2019-12-06 RX ADMIN — HEPARIN SODIUM 5000 UNIT(S): 5000 INJECTION INTRAVENOUS; SUBCUTANEOUS at 21:31

## 2019-12-06 RX ADMIN — Medication 125 MILLILITER(S): at 12:58

## 2019-12-06 RX ADMIN — Medication 10 MILLIGRAM(S): at 02:30

## 2019-12-06 RX ADMIN — Medication 12.5 MILLIGRAM(S): at 10:25

## 2019-12-06 RX ADMIN — HYDROMORPHONE HYDROCHLORIDE 0.5 MILLIGRAM(S): 2 INJECTION INTRAMUSCULAR; INTRAVENOUS; SUBCUTANEOUS at 00:00

## 2019-12-06 NOTE — CONSULT NOTE ADULT - ATTENDING COMMENTS
completed right PCA stroke with matched defect.   recommend repeat FLORIDALMA once patient able to get AC.

## 2019-12-06 NOTE — PROGRESS NOTE ADULT - SUBJECTIVE AND OBJECTIVE BOX
CTICU  CRITICAL  CARE  attending     Hand off received 					   Pertinent clinical, laboratory, radiographic, hemodynamic, echocardiographic, respiratory data, microbiologic data and chart were reviewed and analyzed frequently throughout the course of the day and night  Patient seen and examined with CTS/ SH attending at bedside  Pt is a 34y , Female, HEALTH ISSUES - PROBLEM Dx:      , FAMILY HISTORY:  FHx: congenital heart disease: father and grandfather both had &quot;similar&quot; congenital abnormalities of the heart. Patient does not know specifics, but knows that her grandfather&#x27;s needed surgical repair  PAST MEDICAL & SURGICAL HISTORY:  Anomalous origin of right coronary artery  H/O inguinal hernia repair: age 4    Patient is a 34y old  Female who presents with a chief complaint of Anomalous RCA (06 Dec 2019 12:55)      14 system review was unremarkable    Vital signs, hemodynamic and respiratory parameters were reviewed from the bedside nursing flowsheet.  ICU Vital Signs Last 24 Hrs  T(C): 37.3 (06 Dec 2019 18:39), Max: 37.3 (06 Dec 2019 18:39)  T(F): 99.1 (06 Dec 2019 18:39), Max: 99.1 (06 Dec 2019 18:39)  HR: 100 (06 Dec 2019 22:00) (98 - 114)  BP: 101/67 (06 Dec 2019 17:00) (101/67 - 141/65)  BP(mean): 84 (06 Dec 2019 17:00) (84 - 104)  ABP: 126/64 (06 Dec 2019 22:00) (106/102 - 144/78)  ABP(mean): 86 (06 Dec 2019 22:00) (80 - 126)  RR: 18 (06 Dec 2019 22:00) (7 - 38)  SpO2: 98% (06 Dec 2019 22:00) (98% - 100%)    Adult Advanced Hemodynamics Last 24 Hrs  CVP(mm Hg): 7 (06 Dec 2019 22:00) (7 - 20)  CVP(cm H2O): --  CO: --  CI: --  PA: --  PA(mean): --  PCWP: --  SVR: --  SVRI: --  PVR: --  PVRI: --, ABG - ( 06 Dec 2019 16:37 )  pH, Arterial: 7.41  pH, Blood: x     /  pCO2: 41    /  pO2: 123   / HCO3: 25    / Base Excess: 0.8   /  SaO2: 98                  Intake and output was reviewed and the fluid balance was calculated  Daily     Daily   I&O's Summary    05 Dec 2019 07:01  -  06 Dec 2019 07:00  --------------------------------------------------------  IN: 1911 mL / OUT: 985 mL / NET: 926 mL    06 Dec 2019 07:01  -  06 Dec 2019 22:33  --------------------------------------------------------  IN: 2220 mL / OUT: 900 mL / NET: 1320 mL        All lines and drain sites were assessed  Glycemic trend was reviewedCAPILLARY BLOOD GLUCOSE  149 (06 Dec 2019 12:52)      POCT Blood Glucose.: 153 mg/dL (06 Dec 2019 21:25)    No acute change in mental status  Auscultation of the chest reveals equal bs  Abdomen is soft  Extremities are warm and well perfused  Wounds appear clean and unremarkable  Antibiotics are periop    labs  CBC Full  -  ( 06 Dec 2019 17:23 )  WBC Count : 18.26 K/uL  RBC Count : 2.55 M/uL  Hemoglobin : 7.7 g/dL  Hematocrit : 23.7 %  Platelet Count - Automated : 179 K/uL  Mean Cell Volume : 92.9 fl  Mean Cell Hemoglobin : 30.2 pg  Mean Cell Hemoglobin Concentration : 32.5 gm/dL  Auto Neutrophil # : x  Auto Lymphocyte # : x  Auto Monocyte # : x  Auto Eosinophil # : x  Auto Basophil # : x  Auto Neutrophil % : x  Auto Lymphocyte % : x  Auto Monocyte % : x  Auto Eosinophil % : x  Auto Basophil % : x    12-06    138  |  103  |  9   ----------------------------<  144<H>  4.2   |  26  |  0.67    Ca    8.7      06 Dec 2019 17:23  Phos  2.1     12-06  Mg     2.1     12-06    TPro  5.7<L>  /  Alb  4.0  /  TBili  0.5  /  DBili  x   /  AST  29  /  ALT  20  /  AlkPhos  40  12-06    PT/INR - ( 06 Dec 2019 17:23 )   PT: 14.9 sec;   INR: 1.31          PTT - ( 06 Dec 2019 17:23 )  PTT:58.0 sec  The current medications were reviewed   MEDICATIONS  (STANDING):  aspirin enteric coated 81 milliGRAM(s) Oral daily  atorvastatin 80 milliGRAM(s) Oral at bedtime  ceFAZolin  Injectable. 2000 milliGRAM(s) IV Push every 8 hours  clopidogrel Tablet 75 milliGRAM(s) Oral daily  dextrose 5%. 1000 milliLiter(s) (50 mL/Hr) IV Continuous <Continuous>  dextrose 50% Injectable 12.5 Gram(s) IV Push once  dextrose 50% Injectable 25 Gram(s) IV Push once  dextrose 50% Injectable 25 Gram(s) IV Push once  furosemide   Injectable 20 milliGRAM(s) IV Push two times a day  heparin  Injectable 5000 Unit(s) SubCutaneous every 8 hours  influenza   Vaccine 0.5 milliLiter(s) IntraMuscular once  insulin lispro (HumaLOG) corrective regimen sliding scale   SubCutaneous Before meals and at bedtime  pantoprazole    Tablet 40 milliGRAM(s) Oral before breakfast  polyethylene glycol 3350 17 Gram(s) Oral daily  sodium chloride 0.9%. 1000 milliLiter(s) (10 mL/Hr) IV Continuous <Continuous>    MEDICATIONS  (PRN):  dextrose 40% Gel 15 Gram(s) Oral once PRN Blood Glucose LESS THAN 70 milliGRAM(s)/deciliter  glucagon  Injectable 1 milliGRAM(s) IntraMuscular once PRN Glucose LESS THAN 70 milligrams/deciliter       PROBLEM LIST/ ASSESSMENT:  HEALTH ISSUES - PROBLEM Dx:      ,   Patient is a 34y old  Female who presents with a chief complaint of Anomalous RCA (06 Dec 2019 12:55)     s/p cardiac surgery              My plan includes :  close hemodynamic, ventilatory and drain monitoring and management per post op routine    Monitor for arrhythmias and monitor parameters for organ perfusion  beta blockade not administered due to hemodynamic instability and bradycardia  monitor neurologic status  Head of the bed should remain elevated to 45 deg .   chest PT and IS will be encouraged  monitor adequacy of oxygenation and ventilation and attempt to wean oxygen  antibiotic regimen will be tailored to the clinical, laboratory and microbiologic data  Nutritional goals will be met using po eventually , ensure adequate caloric intake and montior the same  Stress ulcer and VTE prophylaxis will be achieved    Glycemic control is satisfactory  Electrolytes have been repleted as necessary and wound care has been carried out. Pain control has been achieved.   agressive physical therapy and early mobility and ambulation goals will be met   The family was updated about the course and plan  CRITICAL CARE TIME personally provided by me  in evaluation and management, reassessments, review and interpretation of labs and x-rays, ventilator and hemodynamic management, formulating a plan and coordinating care: ___90____ MIN.  Time does not include procedural time.  CTICU ATTENDING     					    Hung Noel MD

## 2019-12-06 NOTE — PHYSICAL THERAPY INITIAL EVALUATION ADULT - GENERAL OBSERVATIONS, REHAB EVAL
Patient received seated in bedside chair with + central line, tele, 2L NC 02, delilah drains, welch catheter, (B) SCDs, temp PPM.

## 2019-12-06 NOTE — CONSULT NOTE ADULT - SUBJECTIVE AND OBJECTIVE BOX
**STROKE CODE CONSULT NOTE**    Last known well time/Time of onset of symptoms:    HPI:    PAST MEDICAL & SURGICAL HISTORY:  Anomalous origin of right coronary artery  H/O inguinal hernia repair: age 4      FAMILY HISTORY:  FHx: congenital heart disease: father and grandfather both had &quot;similar&quot; congenital abnormalities of the heart. Patient does not know specifics, but knows that her grandfather&#x27;s needed surgical repair      SOCIAL HISTORY:  Denies smoking, drinking, or drug use    ROS:  Constitutional: No fever, weight loss or fatigue  Eyes: No eye pain, visual disturbances, or discharge  ENMT:  No difficulty hearing, tinnitus, vertigo; No sinus or throat pain  Neck: No pain or stiffness  Respiratory: No cough, wheezing, chills or hemoptysis  Cardiovascular: No chest pain, palpitations, shortness of breath, dizziness or leg swelling  Gastrointestinal: No abdominal pain. No nausea, vomiting or hematemesis; No diarrhea or constipation. Nohematochezia.  Genitourinary: No dysuria, frequency, hematuria or incontinence  Neurological: As per HPI      MEDICATIONS  (STANDING):  albumin human  5% IVPB 250 milliLiter(s) IV Intermittent every 1 hour  aspirin enteric coated 81 milliGRAM(s) Oral daily  ceFAZolin  Injectable. 2000 milliGRAM(s) IV Push every 8 hours  dextrose 5%. 1000 milliLiter(s) (50 mL/Hr) IV Continuous <Continuous>  dextrose 50% Injectable 12.5 Gram(s) IV Push once  dextrose 50% Injectable 25 Gram(s) IV Push once  dextrose 50% Injectable 25 Gram(s) IV Push once  heparin  Injectable 5000 Unit(s) SubCutaneous every 8 hours  influenza   Vaccine 0.5 milliLiter(s) IntraMuscular once  insulin lispro (HumaLOG) corrective regimen sliding scale   SubCutaneous Before meals and at bedtime  pantoprazole    Tablet 40 milliGRAM(s) Oral before breakfast  polyethylene glycol 3350 17 Gram(s) Oral daily  sodium chloride 0.9%. 1000 milliLiter(s) (10 mL/Hr) IV Continuous <Continuous>    MEDICATIONS  (PRN):  dextrose 40% Gel 15 Gram(s) Oral once PRN Blood Glucose LESS THAN 70 milliGRAM(s)/deciliter  glucagon  Injectable 1 milliGRAM(s) IntraMuscular once PRN Glucose LESS THAN 70 milligrams/deciliter  oxycodone    5 mG/acetaminophen 325 mG 1 Tablet(s) Oral every 6 hours PRN Moderate Pain (4 - 6)  oxycodone    5 mG/acetaminophen 325 mG 2 Tablet(s) Oral every 6 hours PRN Severe Pain (7 - 10)      Allergies    No Known Allergies    Intolerances    lactose (Diarrhea)      Vital Signs Last 24 Hrs  T(C): 36.4 (06 Dec 2019 09:00), Max: 36.9 (06 Dec 2019 01:01)  T(F): 97.6 (06 Dec 2019 09:00), Max: 98.5 (06 Dec 2019 01:01)  HR: 106 (06 Dec 2019 12:27) (94 - 114)  BP: 139/74 (06 Dec 2019 12:27) (110/63 - 141/65)  BP(mean): 100 (06 Dec 2019 12:00) (88 - 104)  RR: 33 (06 Dec 2019 12:27) (6 - 38)  SpO2: 100% (06 Dec 2019 12:27) (99% - 100%)    PHYSICAL EXAM:  Constitutional: WDWN; NAD    Neurologic:  -Mental status: Awake, alert and oriented to person, age, and month. Speech is fluent with intact naming, able to describe picture in full.  Recent and remote memory intact.  Attention/concentration intact.  No dysarthria, no aphasia.  Fund of knowledge appropriate.    -Cranial nerves:  II: Visual fields full to confrontation. Pupils PERRL  III, IV, VI: extraocular movements are intact without nystagmus  V: Facial sensation intact V1 through V3 intact bilaterally  VII: Face is symmetric with normal eye closure and smile.  VIII: hearing is intact to finger rub  IX, X: uvula is midline and soft palate rises symmetrically  XI: Head turning and shoulder shrug intact  XII: Tongue protrudes in the midline    -Motor:  Normal bulk and tone, strength 5/5 in bilateral upper and lower extremities.   strength 5/5.    -Sensation: Intact to light touch.  No neglect.   -Coordination: No dysmetria on finger-to-nose and heel-to-shin.  No clumsiness.  -Reflexes:downgoing toes bilaterally  -Gait: Narrow and steady. No ataxia.  Romberg negative    NIHSS:    Fingerstick Blood Glucose: CAPILLARY BLOOD GLUCOSE  149 (06 Dec 2019 12:52)      POCT Blood Glucose.: 149 mg/dL (06 Dec 2019 12:42)       LABS:                        8.7    20.98 )-----------( 207      ( 06 Dec 2019 10:01 )             26.3     12-06    139  |  104  |  10  ----------------------------<  181<H>  4.4   |  24  |  0.69    Ca    8.6      06 Dec 2019 10:00  Phos  2.7     12-06  Mg     2.2     12-06    TPro  6.2  /  Alb  4.2  /  TBili  0.4  /  DBili  x   /  AST  33  /  ALT  22  /  AlkPhos  42  12-06    PT/INR - ( 06 Dec 2019 10:00 )   PT: 14.0 sec;   INR: 1.23          PTT - ( 06 Dec 2019 10:00 )  PTT:24.0 sec          RADIOLOGY & ADDITIONAL STUDIES:        IV-tPA (Y/N):    N  Reason IV-tPA not given: outside window    ASSESSMENT/PLAN:    34y Female s/p transposition of right coronary artery done yesterday 12/5, today woke up with blurry vision. CT head with acute right PCA infarct, matched defect on perfusion.       1)Secondary stroke prevention  -Start Aspirin 81 when cleared by CTICU  -Start Atorvastatin 80    2) Labs: Obtain Hemoglobin A1c, Lipid profile set, and TSH    3) Other tests:  -Recommend FLORIDALMA  -Recommend OT    4)DVT ppx - SCDs **STROKE CODE CONSULT NOTE**    Last known well time/Time of onset of symptoms:    HPI:  34y Female s/p transposition of right coronary artery done yesterday 12/5, today woke up with blurry vision. Pt states that yesterday after surgery she felt fine, was texting on her phone and felt that she was able to read and her eyesight was normal. Pt states that she woke up this morning with blurry vision that she believes is in both eyes. She thought it was because she did not have glasses on, so did not say anything, and when she put her glasses on states that the vision remained blurry and started getting worse. She then let the team know. Pt denies any weakness, numbness, or speech problems. States that her chest hurts at the surgery site.     BP ranged 125/75 to 140s.    PAST MEDICAL & SURGICAL HISTORY:  Anomalous origin of right coronary artery  H/O inguinal hernia repair: age 4      FAMILY HISTORY:  FHx: congenital heart disease: father and grandfather both had &quot;similar&quot; congenital abnormalities of the heart. Patient does not know specifics, but knows that her grandfather&#x27;s needed surgical repair  Father had CVA in his 50s      SOCIAL HISTORY:  Denies smoking, drinking, or drug use    ROS:  Constitutional: No fever, weight loss or fatigue  Eyes: No eye painor discharge  ENMT:  No difficulty hearing, tinnitus, vertigo; No sinus or throat pain  Neck: No pain or stiffness  Respiratory: No cough, wheezing, chills or hemoptysis  Cardiovascular: No  palpitations, shortness of breath, dizziness or leg swelling  Gastrointestinal: No abdominal pain. No nausea, vomiting or hematemesis; No diarrhea or constipation. Nohematochezia.  Genitourinary: No dysuria, frequency, hematuria or incontinence  Neurological: As per HPI      MEDICATIONS  (STANDING):  albumin human  5% IVPB 250 milliLiter(s) IV Intermittent every 1 hour  aspirin enteric coated 81 milliGRAM(s) Oral daily  ceFAZolin  Injectable. 2000 milliGRAM(s) IV Push every 8 hours  dextrose 5%. 1000 milliLiter(s) (50 mL/Hr) IV Continuous <Continuous>  dextrose 50% Injectable 12.5 Gram(s) IV Push once  dextrose 50% Injectable 25 Gram(s) IV Push once  dextrose 50% Injectable 25 Gram(s) IV Push once  heparin  Injectable 5000 Unit(s) SubCutaneous every 8 hours  influenza   Vaccine 0.5 milliLiter(s) IntraMuscular once  insulin lispro (HumaLOG) corrective regimen sliding scale   SubCutaneous Before meals and at bedtime  pantoprazole    Tablet 40 milliGRAM(s) Oral before breakfast  polyethylene glycol 3350 17 Gram(s) Oral daily  sodium chloride 0.9%. 1000 milliLiter(s) (10 mL/Hr) IV Continuous <Continuous>    MEDICATIONS  (PRN):  dextrose 40% Gel 15 Gram(s) Oral once PRN Blood Glucose LESS THAN 70 milliGRAM(s)/deciliter  glucagon  Injectable 1 milliGRAM(s) IntraMuscular once PRN Glucose LESS THAN 70 milligrams/deciliter  oxycodone    5 mG/acetaminophen 325 mG 1 Tablet(s) Oral every 6 hours PRN Moderate Pain (4 - 6)  oxycodone    5 mG/acetaminophen 325 mG 2 Tablet(s) Oral every 6 hours PRN Severe Pain (7 - 10)      Allergies    No Known Allergies    Intolerances    lactose (Diarrhea)      Vital Signs Last 24 Hrs  T(C): 36.4 (06 Dec 2019 09:00), Max: 36.9 (06 Dec 2019 01:01)  T(F): 97.6 (06 Dec 2019 09:00), Max: 98.5 (06 Dec 2019 01:01)  HR: 106 (06 Dec 2019 12:27) (94 - 114)  BP: 139/74 (06 Dec 2019 12:27) (110/63 - 141/65)  BP(mean): 100 (06 Dec 2019 12:00) (88 - 104)  RR: 33 (06 Dec 2019 12:27) (6 - 38)  SpO2: 100% (06 Dec 2019 12:27) (99% - 100%)    PHYSICAL EXAM:  Constitutional: WDWN; NAD    Neurologic:  -Mental status: Awake, alert and oriented to person, age, and month. Speech is fluent with intact naming, able to describe picture in full.  Recent and remote memory intact.  Attention/concentration intact.  No dysarthria, no aphasia.  Fund of knowledge appropriate.    -Cranial nerves:  II: Pupils PERRL. Complete left homonymous hemianopsia  III, IV, VI: extraocular movements are intact without nystagmus  VII: Face is symmetric with normal eye closure and smile.  VIII: hearing is intact to finger rub  IX, X: uvula is midline and soft palate rises symmetrically  XI: Head turning and shoulder shrug intact  XII: Tongue protrudes in the midline    -Motor:  Normal bulk and tone, strength 5/5 in bilateral upper and lower extremities.   strength 5/5.    -Sensation: Intact to light touch.  No neglect.   -Coordination: No dysmetria on finger-to-nose and heel-to-shin.  No clumsiness.  -Reflexes:downgoing toes bilaterally  -Gait: deferred    NIHSS: 2    Fingerstick Blood Glucose: CAPILLARY BLOOD GLUCOSE  149 (06 Dec 2019 12:52)      POCT Blood Glucose.: 149 mg/dL (06 Dec 2019 12:42)       LABS:                        8.7    20.98 )-----------( 207      ( 06 Dec 2019 10:01 )             26.3     12-06    139  |  104  |  10  ----------------------------<  181<H>  4.4   |  24  |  0.69    Ca    8.6      06 Dec 2019 10:00  Phos  2.7     12-06  Mg     2.2     12-06    TPro  6.2  /  Alb  4.2  /  TBili  0.4  /  DBili  x   /  AST  33  /  ALT  22  /  AlkPhos  42  12-06    PT/INR - ( 06 Dec 2019 10:00 )   PT: 14.0 sec;   INR: 1.23          PTT - ( 06 Dec 2019 10:00 )  PTT:24.0 sec          RADIOLOGY & ADDITIONAL STUDIES:  < from: CT Brain Stroke Protocol (12.06.19 @ 12:00) >  IMPRESSION:     Acute nonhemorrhagic right  PCA territory infarct.    < end of copied text >    < from: CT Angio Head w/ IV Cont (12.06.19 @ 12:13) >  IMPRESSION: Occulusion of the P4 segment of the right PCA.    < end of copied text >    < from: CT Angio Head w/ IV Cont (12.06.19 @ 12:13) >  IMPRESSION: Normal CTA of the neck.    < end of copied text >    < from: CT Perfusion w/ Maps w/ IV Cont (12.06.19 @ 12:13) >    IMPRESSION: Acute ischemia along the right PCA territory. Abnormal   perfusion with RAPID calculated mismatch volume of -2 ml and mismatch   ratio is 0.7.    < end of copied text >        IV-tPA (Y/N):    N  Reason IV-tPA not given: outside window    ASSESSMENT/PLAN:    34y Female s/p transposition of right coronary artery done yesterday 12/5, today woke up with blurry vision. CT head with acute right PCA infarct, matched defect on perfusion.       1)Secondary stroke prevention  -Start Aspirin 81 when cleared by CTICU  -Start Atorvastatin 80    2) Labs: Obtain Hemoglobin A1c, Lipid profile set, and TSH    3) Other tests:  -Recommend FLORIDALMA  -Recommend OT    4)DVT ppx - SCDs  pharmacologic DVT ppx when cleared by CTICU

## 2019-12-06 NOTE — PHYSICAL THERAPY INITIAL EVALUATION ADULT - PERTINENT HX OF CURRENT PROBLEM, REHAB EVAL
35 yo female, [facility] resident, with no significant PMHx and significant family history for congenital heart disease, who began noticing decreasing exercise tolerance over the past 3 years. S/P workup, now s/p above procedure.

## 2019-12-06 NOTE — PHYSICAL THERAPY INITIAL EVALUATION ADULT - CRITERIA FOR SKILLED THERAPEUTIC INTERVENTIONS
anticipated equipment needs at discharge/impairments found/rehab potential/therapy frequency/anticipated discharge recommendation

## 2019-12-06 NOTE — PHYSICAL THERAPY INITIAL EVALUATION ADULT - ADDITIONAL COMMENTS
Patient reports that she resides in [facility]. States that she plans to live with her parents in [place] post hospitalization. No steps to climb at her parent's residence. Reports that she was an independent community ambulator at baseline.

## 2019-12-06 NOTE — PROCEDURE NOTE - NSPROCDETAILS_GEN_ALL_CORE
ultrasound guidance/location identified, draped/prepped, sterile technique used, needle inserted/introduced/connected to a pressurized flush line/sutured in place/hemostasis with direct pressure, dressing applied/positive blood return obtained via catheter/Seldinger technique/all materials/supplies accounted for at end of procedure

## 2019-12-06 NOTE — PROCEDURE NOTE - NSPOSTCAREGUIDE_GEN_A_CORE
Instructed patient/caregiver to follow-up with primary care physician/Verbal/written post procedure instructions were given to patient/caregiver/Keep the cast/splint/dressing clean and dry/Care for catheter as per unit/ICU protocols/Instructed patient/caregiver regarding signs and symptoms of infection

## 2019-12-06 NOTE — PROCEDURE NOTE - NSINDICATIONS_GEN_A_CORE
arterial puncture to obtain ABG's/cannulation purposes/monitoring purposes/critical patient/blood sampling

## 2019-12-07 LAB
ALBUMIN SERPL ELPH-MCNC: 3.9 G/DL — SIGNIFICANT CHANGE UP (ref 3.3–5)
ALBUMIN SERPL ELPH-MCNC: 4.4 G/DL — SIGNIFICANT CHANGE UP (ref 3.3–5)
ALP SERPL-CCNC: 40 U/L — SIGNIFICANT CHANGE UP (ref 40–120)
ALP SERPL-CCNC: 52 U/L — SIGNIFICANT CHANGE UP (ref 40–120)
ALT FLD-CCNC: 19 U/L — SIGNIFICANT CHANGE UP (ref 10–45)
ALT FLD-CCNC: 20 U/L — SIGNIFICANT CHANGE UP (ref 10–45)
ANION GAP SERPL CALC-SCNC: 10 MMOL/L — SIGNIFICANT CHANGE UP (ref 5–17)
ANION GAP SERPL CALC-SCNC: 10 MMOL/L — SIGNIFICANT CHANGE UP (ref 5–17)
APTT BLD: 28.4 SEC — SIGNIFICANT CHANGE UP (ref 27.5–36.3)
APTT BLD: 32.6 SEC — SIGNIFICANT CHANGE UP (ref 27.5–36.3)
AST SERPL-CCNC: 35 U/L — SIGNIFICANT CHANGE UP (ref 10–40)
AST SERPL-CCNC: 36 U/L — SIGNIFICANT CHANGE UP (ref 10–40)
BILIRUB SERPL-MCNC: 0.4 MG/DL — SIGNIFICANT CHANGE UP (ref 0.2–1.2)
BILIRUB SERPL-MCNC: 0.6 MG/DL — SIGNIFICANT CHANGE UP (ref 0.2–1.2)
BUN SERPL-MCNC: 6 MG/DL — LOW (ref 7–23)
BUN SERPL-MCNC: 7 MG/DL — SIGNIFICANT CHANGE UP (ref 7–23)
CALCIUM SERPL-MCNC: 8.7 MG/DL — SIGNIFICANT CHANGE UP (ref 8.4–10.5)
CALCIUM SERPL-MCNC: 8.9 MG/DL — SIGNIFICANT CHANGE UP (ref 8.4–10.5)
CHLORIDE SERPL-SCNC: 101 MMOL/L — SIGNIFICANT CHANGE UP (ref 96–108)
CHLORIDE SERPL-SCNC: 102 MMOL/L — SIGNIFICANT CHANGE UP (ref 96–108)
CO2 SERPL-SCNC: 27 MMOL/L — SIGNIFICANT CHANGE UP (ref 22–31)
CO2 SERPL-SCNC: 29 MMOL/L — SIGNIFICANT CHANGE UP (ref 22–31)
CREAT SERPL-MCNC: 0.68 MG/DL — SIGNIFICANT CHANGE UP (ref 0.5–1.3)
CREAT SERPL-MCNC: 0.69 MG/DL — SIGNIFICANT CHANGE UP (ref 0.5–1.3)
GAS PNL BLDA: SIGNIFICANT CHANGE UP
GLUCOSE BLDC GLUCOMTR-MCNC: 112 MG/DL — HIGH (ref 70–99)
GLUCOSE BLDC GLUCOMTR-MCNC: 121 MG/DL — HIGH (ref 70–99)
GLUCOSE BLDC GLUCOMTR-MCNC: 124 MG/DL — HIGH (ref 70–99)
GLUCOSE BLDC GLUCOMTR-MCNC: 134 MG/DL — HIGH (ref 70–99)
GLUCOSE SERPL-MCNC: 131 MG/DL — HIGH (ref 70–99)
GLUCOSE SERPL-MCNC: 145 MG/DL — HIGH (ref 70–99)
HCT VFR BLD CALC: 27.4 % — LOW (ref 34.5–45)
HCT VFR BLD CALC: 30.1 % — LOW (ref 34.5–45)
HGB BLD-MCNC: 10.1 G/DL — LOW (ref 11.5–15.5)
HGB BLD-MCNC: 9.3 G/DL — LOW (ref 11.5–15.5)
INR BLD: 1.22 — HIGH (ref 0.88–1.16)
INR BLD: 1.28 — HIGH (ref 0.88–1.16)
LACTATE SERPL-SCNC: 1.6 MMOL/L — SIGNIFICANT CHANGE UP (ref 0.5–2)
MAGNESIUM SERPL-MCNC: 2.1 MG/DL — SIGNIFICANT CHANGE UP (ref 1.6–2.6)
MAGNESIUM SERPL-MCNC: 2.2 MG/DL — SIGNIFICANT CHANGE UP (ref 1.6–2.6)
MCHC RBC-ENTMCNC: 30.1 PG — SIGNIFICANT CHANGE UP (ref 27–34)
MCHC RBC-ENTMCNC: 30.4 PG — SIGNIFICANT CHANGE UP (ref 27–34)
MCHC RBC-ENTMCNC: 33.6 GM/DL — SIGNIFICANT CHANGE UP (ref 32–36)
MCHC RBC-ENTMCNC: 33.9 GM/DL — SIGNIFICANT CHANGE UP (ref 32–36)
MCV RBC AUTO: 89.5 FL — SIGNIFICANT CHANGE UP (ref 80–100)
MCV RBC AUTO: 89.6 FL — SIGNIFICANT CHANGE UP (ref 80–100)
NRBC # BLD: 0 /100 WBCS — SIGNIFICANT CHANGE UP (ref 0–0)
NRBC # BLD: 0 /100 WBCS — SIGNIFICANT CHANGE UP (ref 0–0)
PHOSPHATE SERPL-MCNC: 1.3 MG/DL — LOW (ref 2.5–4.5)
PHOSPHATE SERPL-MCNC: 1.5 MG/DL — LOW (ref 2.5–4.5)
PLATELET # BLD AUTO: 158 K/UL — SIGNIFICANT CHANGE UP (ref 150–400)
PLATELET # BLD AUTO: 174 K/UL — SIGNIFICANT CHANGE UP (ref 150–400)
POTASSIUM SERPL-MCNC: 3.6 MMOL/L — SIGNIFICANT CHANGE UP (ref 3.5–5.3)
POTASSIUM SERPL-MCNC: 4 MMOL/L — SIGNIFICANT CHANGE UP (ref 3.5–5.3)
POTASSIUM SERPL-SCNC: 3.6 MMOL/L — SIGNIFICANT CHANGE UP (ref 3.5–5.3)
POTASSIUM SERPL-SCNC: 4 MMOL/L — SIGNIFICANT CHANGE UP (ref 3.5–5.3)
PROT SERPL-MCNC: 5.8 G/DL — LOW (ref 6–8.3)
PROT SERPL-MCNC: 6.6 G/DL — SIGNIFICANT CHANGE UP (ref 6–8.3)
PROTHROM AB SERPL-ACNC: 13.9 SEC — HIGH (ref 10–12.9)
PROTHROM AB SERPL-ACNC: 14.6 SEC — HIGH (ref 10–12.9)
RBC # BLD: 3.06 M/UL — LOW (ref 3.8–5.2)
RBC # BLD: 3.36 M/UL — LOW (ref 3.8–5.2)
RBC # FLD: 14.1 % — SIGNIFICANT CHANGE UP (ref 10.3–14.5)
RBC # FLD: 14.2 % — SIGNIFICANT CHANGE UP (ref 10.3–14.5)
SODIUM SERPL-SCNC: 139 MMOL/L — SIGNIFICANT CHANGE UP (ref 135–145)
SODIUM SERPL-SCNC: 140 MMOL/L — SIGNIFICANT CHANGE UP (ref 135–145)
WBC # BLD: 18.56 K/UL — HIGH (ref 3.8–10.5)
WBC # BLD: 22.02 K/UL — HIGH (ref 3.8–10.5)
WBC # FLD AUTO: 18.56 K/UL — HIGH (ref 3.8–10.5)
WBC # FLD AUTO: 22.02 K/UL — HIGH (ref 3.8–10.5)

## 2019-12-07 PROCEDURE — 99291 CRITICAL CARE FIRST HOUR: CPT

## 2019-12-07 PROCEDURE — 99232 SBSQ HOSP IP/OBS MODERATE 35: CPT

## 2019-12-07 PROCEDURE — 71045 X-RAY EXAM CHEST 1 VIEW: CPT | Mod: 26

## 2019-12-07 PROCEDURE — 99292 CRITICAL CARE ADDL 30 MIN: CPT

## 2019-12-07 RX ORDER — ACETAMINOPHEN 500 MG
650 TABLET ORAL EVERY 6 HOURS
Refills: 0 | Status: DISCONTINUED | OUTPATIENT
Start: 2019-12-07 | End: 2019-12-11

## 2019-12-07 RX ORDER — FUROSEMIDE 40 MG
20 TABLET ORAL ONCE
Refills: 0 | Status: COMPLETED | OUTPATIENT
Start: 2019-12-07 | End: 2019-12-07

## 2019-12-07 RX ORDER — POTASSIUM CHLORIDE 20 MEQ
20 PACKET (EA) ORAL ONCE
Refills: 0 | Status: COMPLETED | OUTPATIENT
Start: 2019-12-07 | End: 2019-12-07

## 2019-12-07 RX ORDER — FUROSEMIDE 40 MG
10 TABLET ORAL ONCE
Refills: 0 | Status: COMPLETED | OUTPATIENT
Start: 2019-12-07 | End: 2019-12-07

## 2019-12-07 RX ORDER — METOPROLOL TARTRATE 50 MG
12.5 TABLET ORAL EVERY 12 HOURS
Refills: 0 | Status: DISCONTINUED | OUTPATIENT
Start: 2019-12-07 | End: 2019-12-08

## 2019-12-07 RX ORDER — ALBUMIN HUMAN 25 %
250 VIAL (ML) INTRAVENOUS ONCE
Refills: 0 | Status: COMPLETED | OUTPATIENT
Start: 2019-12-07 | End: 2019-12-07

## 2019-12-07 RX ORDER — KETOROLAC TROMETHAMINE 30 MG/ML
15 SYRINGE (ML) INJECTION EVERY 6 HOURS
Refills: 0 | Status: DISCONTINUED | OUTPATIENT
Start: 2019-12-07 | End: 2019-12-08

## 2019-12-07 RX ORDER — METOCLOPRAMIDE HCL 10 MG
10 TABLET ORAL ONCE
Refills: 0 | Status: COMPLETED | OUTPATIENT
Start: 2019-12-07 | End: 2019-12-07

## 2019-12-07 RX ORDER — POTASSIUM PHOSPHATE, MONOBASIC POTASSIUM PHOSPHATE, DIBASIC 236; 224 MG/ML; MG/ML
15 INJECTION, SOLUTION INTRAVENOUS ONCE
Refills: 0 | Status: COMPLETED | OUTPATIENT
Start: 2019-12-07 | End: 2019-12-07

## 2019-12-07 RX ORDER — ONDANSETRON 8 MG/1
4 TABLET, FILM COATED ORAL ONCE
Refills: 0 | Status: COMPLETED | OUTPATIENT
Start: 2019-12-07 | End: 2019-12-08

## 2019-12-07 RX ORDER — OXYCODONE AND ACETAMINOPHEN 5; 325 MG/1; MG/1
1 TABLET ORAL EVERY 6 HOURS
Refills: 0 | Status: DISCONTINUED | OUTPATIENT
Start: 2019-12-07 | End: 2019-12-11

## 2019-12-07 RX ORDER — POTASSIUM CHLORIDE 20 MEQ
20 PACKET (EA) ORAL
Refills: 0 | Status: COMPLETED | OUTPATIENT
Start: 2019-12-07 | End: 2019-12-07

## 2019-12-07 RX ADMIN — Medication 125 MILLILITER(S): at 10:00

## 2019-12-07 RX ADMIN — Medication 1000 MILLIGRAM(S): at 00:41

## 2019-12-07 RX ADMIN — Medication 20 MILLIGRAM(S): at 06:21

## 2019-12-07 RX ADMIN — Medication 2000 MILLIGRAM(S): at 00:32

## 2019-12-07 RX ADMIN — Medication 81 MILLIGRAM(S): at 10:36

## 2019-12-07 RX ADMIN — HEPARIN SODIUM 5000 UNIT(S): 5000 INJECTION INTRAVENOUS; SUBCUTANEOUS at 21:26

## 2019-12-07 RX ADMIN — Medication 10 MILLIGRAM(S): at 03:16

## 2019-12-07 RX ADMIN — Medication 400 MILLIGRAM(S): at 00:26

## 2019-12-07 RX ADMIN — Medication 10 MILLIGRAM(S): at 07:05

## 2019-12-07 RX ADMIN — Medication 15 MILLIGRAM(S): at 10:36

## 2019-12-07 RX ADMIN — Medication 100 MILLIEQUIVALENT(S): at 16:04

## 2019-12-07 RX ADMIN — Medication 15 MILLIGRAM(S): at 11:00

## 2019-12-07 RX ADMIN — HEPARIN SODIUM 5000 UNIT(S): 5000 INJECTION INTRAVENOUS; SUBCUTANEOUS at 16:05

## 2019-12-07 RX ADMIN — Medication 125 MILLILITER(S): at 16:17

## 2019-12-07 RX ADMIN — Medication 100 MILLIEQUIVALENT(S): at 06:21

## 2019-12-07 RX ADMIN — Medication 100 MILLIEQUIVALENT(S): at 07:40

## 2019-12-07 RX ADMIN — Medication 15 MILLIGRAM(S): at 18:31

## 2019-12-07 RX ADMIN — POTASSIUM PHOSPHATE, MONOBASIC POTASSIUM PHOSPHATE, DIBASIC 63.75 MILLIMOLE(S): 236; 224 INJECTION, SOLUTION INTRAVENOUS at 06:30

## 2019-12-07 RX ADMIN — Medication 12.5 MILLIGRAM(S): at 18:37

## 2019-12-07 RX ADMIN — ATORVASTATIN CALCIUM 80 MILLIGRAM(S): 80 TABLET, FILM COATED ORAL at 21:26

## 2019-12-07 RX ADMIN — Medication 2000 MILLIGRAM(S): at 10:35

## 2019-12-07 RX ADMIN — POLYETHYLENE GLYCOL 3350 17 GRAM(S): 17 POWDER, FOR SOLUTION ORAL at 10:36

## 2019-12-07 RX ADMIN — CLOPIDOGREL BISULFATE 75 MILLIGRAM(S): 75 TABLET, FILM COATED ORAL at 10:36

## 2019-12-07 RX ADMIN — PANTOPRAZOLE SODIUM 40 MILLIGRAM(S): 20 TABLET, DELAYED RELEASE ORAL at 06:20

## 2019-12-07 RX ADMIN — Medication 15 MILLIGRAM(S): at 16:03

## 2019-12-07 NOTE — PROGRESS NOTE ADULT - SUBJECTIVE AND OBJECTIVE BOX
CTICU  CRITICAL  CARE  attending     Hand off received 					   Pertinent clinical, laboratory, radiographic, hemodynamic, echocardiographic, respiratory data, microbiologic data and chart were reviewed and analyzed frequently throughout the course of the day and night  Patient seen and examined with CTS/ SH attending at bedside    Pt is a 34y , Female, post op day #2 s/p repair of right coronary artery ;2/2 transposition of right coronary artery;      post op:    acute CVA of Right PCA  visual field cuts      , FAMILY HISTORY:  FHx: congenital heart disease: father and grandfather both had &quot;similar&quot; congenital abnormalities of the heart. Patient does not know specifics, but knows that her grandfather&#x27;s needed surgical repair  PAST MEDICAL & SURGICAL HISTORY:  Anomalous origin of right coronary artery  H/O inguinal hernia repair: age 4    Patient is a 34y old  Female who presents with a chief complaint of Anomalous RCA (06 Dec 2019 22:33)      14 system review was unremarkable    Vital signs, hemodynamic and respiratory parameters were reviewed from the bedside nursing flowsheet.  ICU Vital Signs Last 24 Hrs  T(C): 37.3 (07 Dec 2019 14:30), Max: 37.3 (06 Dec 2019 18:39)  T(F): 99.1 (07 Dec 2019 14:30), Max: 99.1 (06 Dec 2019 18:39)  HR: 108 (07 Dec 2019 16:00) (98 - 112)  BP: 122/75 (07 Dec 2019 14:00) (116/67 - 122/75)  BP(mean): 93 (07 Dec 2019 14:00) (88 - 97)  ABP: 88/70 (07 Dec 2019 11:00) (88/70 - 142/78)  ABP(mean): 82 (07 Dec 2019 11:00) (82 - 104)  RR: 20 (07 Dec 2019 14:00) (16 - 20)  SpO2: 99% (07 Dec 2019 16:00) (94% - 99%)    Adult Advanced Hemodynamics Last 24 Hrs  CVP(mm Hg): 2 (07 Dec 2019 11:00) (2 - 18)  CVP(cm H2O): --  CO: --  CI: --  PA: --  PA(mean): --  PCWP: --  SVR: --  SVRI: --  PVR: --  PVRI: --, ABG - ( 07 Dec 2019 03:16 )  pH, Arterial: 7.45  pH, Blood: x     /  pCO2: 42    /  pO2: 120   / HCO3: 29    / Base Excess: 4.7   /  SaO2: 98                  Intake and output was reviewed and the fluid balance was calculated  Daily     Daily   I&O's Summary    06 Dec 2019 07:01  -  07 Dec 2019 07:00  --------------------------------------------------------  IN: 2612.5 mL / OUT: 2070 mL / NET: 542.5 mL    07 Dec 2019 07:01  -  07 Dec 2019 17:00  --------------------------------------------------------  IN: 1132.5 mL / OUT: 1560 mL / NET: -427.5 mL        All lines and drain sites were assessed  Glycemic trend was reviewedCAPILLARY BLOOD GLUCOSE  149 (06 Dec 2019 12:52)      POCT Blood Glucose.: 124 mg/dL (07 Dec 2019 11:29)    No acute change in mental status  Auscultation of the chest reveals equal bs  Abdomen is soft  Extremities are warm and well perfused  Wounds appear clean and unremarkable  Antibiotics are periop    labs  CBC Full  -  ( 07 Dec 2019 10:53 )  WBC Count : 22.02 K/uL  RBC Count : 3.36 M/uL  Hemoglobin : 10.1 g/dL  Hematocrit : 30.1 %  Platelet Count - Automated : 174 K/uL  Mean Cell Volume : 89.6 fl  Mean Cell Hemoglobin : 30.1 pg  Mean Cell Hemoglobin Concentration : 33.6 gm/dL  Auto Neutrophil # : x  Auto Lymphocyte # : x  Auto Monocyte # : x  Auto Eosinophil # : x  Auto Basophil # : x  Auto Neutrophil % : x  Auto Lymphocyte % : x  Auto Monocyte % : x  Auto Eosinophil % : x  Auto Basophil % : x    12-07    140  |  101  |  6<L>  ----------------------------<  131<H>  4.0   |  29  |  0.68    Ca    8.9      07 Dec 2019 10:53  Phos  1.3     12-07  Mg     2.1     12-07    TPro  6.6  /  Alb  4.4  /  TBili  0.6  /  DBili  x   /  AST  36  /  ALT  20  /  AlkPhos  52  12-07    PT/INR - ( 07 Dec 2019 10:53 )   PT: 13.9 sec;   INR: 1.22          PTT - ( 07 Dec 2019 10:53 )  PTT:28.4 sec  The current medications were reviewed   MEDICATIONS  (STANDING):  aspirin enteric coated 81 milliGRAM(s) Oral daily  atorvastatin 80 milliGRAM(s) Oral at bedtime  clopidogrel Tablet 75 milliGRAM(s) Oral daily  dextrose 5%. 1000 milliLiter(s) (50 mL/Hr) IV Continuous <Continuous>  dextrose 50% Injectable 12.5 Gram(s) IV Push once  dextrose 50% Injectable 25 Gram(s) IV Push once  dextrose 50% Injectable 25 Gram(s) IV Push once  heparin  Injectable 5000 Unit(s) SubCutaneous every 8 hours  influenza   Vaccine 0.5 milliLiter(s) IntraMuscular once  insulin lispro (HumaLOG) corrective regimen sliding scale   SubCutaneous Before meals and at bedtime  metoprolol tartrate 12.5 milliGRAM(s) Oral every 12 hours  ondansetron Injectable 4 milliGRAM(s) IV Push once  pantoprazole    Tablet 40 milliGRAM(s) Oral before breakfast  polyethylene glycol 3350 17 Gram(s) Oral daily  sodium chloride 0.9%. 1000 milliLiter(s) (10 mL/Hr) IV Continuous <Continuous>    MEDICATIONS  (PRN):  acetaminophen   Tablet .. 650 milliGRAM(s) Oral every 6 hours PRN Mild Pain (1 - 3)  dextrose 40% Gel 15 Gram(s) Oral once PRN Blood Glucose LESS THAN 70 milliGRAM(s)/deciliter  glucagon  Injectable 1 milliGRAM(s) IntraMuscular once PRN Glucose LESS THAN 70 milligrams/deciliter  ketorolac   Injectable 15 milliGRAM(s) IV Push every 6 hours PRN Moderate Pain (4 - 6)  oxycodone    5 mG/acetaminophen 325 mG 1 Tablet(s) Oral every 6 hours PRN Severe Pain (7 - 10)       PROBLEM LIST/ ASSESSMENT:  HEALTH ISSUES - PROBLEM Dx:      ,   Patient is a 34y old  Female who presents with a chief complaint of Anomalous RCA (06 Dec 2019 22:33)     s/p repair of anomalous right coronary artery;      My plan includes :  close hemodynamic, ventilatory and drain monitoring and management per post op routine    Monitor for arrhythmias and monitor parameters for organ perfusion  monitor neurologic status  Head of the bed should remain elevated to 45 deg .   chest PT and IS will be encouraged  monitor adequacy of oxygenation and ventilation and attempt to wean oxygen  Nutritional goals will be met using po eventually , ensure adequate caloric intake and montior the same  Stress ulcer and VTE prophylaxis will be achieved    Glycemic control is satisfactory  Electrolytes have been repleted as necessary and wound care has been carried out. Pain control has been achieved.   agressive physical therapy and early mobility and ambulation goals will be met   The family was updated about the course and plan  CRITICAL CARE TIME SPENT in evaluation and management, reassessments, review and interpretation of labs and x-rays, ventilator and hemodynamic management, formulating a plan and coordinating care: ___55___ MIN.  Time does not include procedural time.  CTICU ATTENDING     					    Carter Wallace MD

## 2019-12-07 NOTE — PROGRESS NOTE ADULT - SUBJECTIVE AND OBJECTIVE BOX
CTICU  CRITICAL  CARE  ATTENDING:   				   Pertinent clinical, laboratory, radiographic, hemodynamic, echocardiographic, respiratory data, microbiologic data and chart were reviewed and analyzed frequently throughout the course of the day and night    Patient seen and examined with CTS/ SH attending at bedside    Pt is a 34y Female admitted for villalba     HEALTH ISSUES - PROBLEM Dx:         FAMILY HISTORY:  FHx: congenital heart disease: father and grandfather both had &quot;similar&quot; congenital abnormalities of the heart. Patient does not know specifics, but knows that her grandfather&#x27;s needed surgical repair  PAST MEDICAL & SURGICAL HISTORY:  Anomalous origin of right coronary artery  H/O inguinal hernia repair: age 4      14 system review was unremarkable      Vital signs, hemodynamic and respiratory parameters were reviewed from the bedside nursing flowsheet.  ICU Vital Signs Last 24 Hrs  T(C): 37.6 (07 Dec 2019 17:44), Max: 37.6 (07 Dec 2019 17:44)  T(F): 99.7 (07 Dec 2019 17:44), Max: 99.7 (07 Dec 2019 17:44)  HR: 110 (07 Dec 2019 19:00) (98 - 112)  BP: 107/79 (07 Dec 2019 19:00) (107/79 - 127/78)  BP(mean): 90 (07 Dec 2019 19:00) (88 - 99)  ABP: 88/70 (07 Dec 2019 11:00) (88/70 - 142/78)  ABP(mean): 82 (07 Dec 2019 11:00) (82 - 104)  RR: 20 (07 Dec 2019 14:00) (16 - 20)  SpO2: 97% (07 Dec 2019 19:00) (94% - 99%)    Adult Advanced Hemodynamics Last 24 Hrs  CVP(mm Hg): 2 (07 Dec 2019 11:00) (2 - 15)  CVP(cm H2O): --  CO: --  CI: --  PA: --  PA(mean): --  PCWP: --  SVR: --  SVRI: --  PVR: --  PVRI: --, ABG - ( 07 Dec 2019 03:16 )  pH, Arterial: 7.45  pH, Blood: x     /  pCO2: 42    /  pO2: 120   / HCO3: 29    / Base Excess: 4.7   /  SaO2: 98                  Intake and output was reviewed and the fluid balance was calculated  Daily     Daily   I&O's Summary    06 Dec 2019 07:01  -  07 Dec 2019 07:00  --------------------------------------------------------  IN: 2612.5 mL / OUT: 2070 mL / NET: 542.5 mL    07 Dec 2019 07:01  -  07 Dec 2019 20:50  --------------------------------------------------------  IN: 1172.5 mL / OUT: 1650 mL / NET: -477.5 mL        All lines and drain sites were assessed  Glycemic trend was reviewedCAPILLARY BLOOD GLUCOSE  149 (06 Dec 2019 12:52)      POCT Blood Glucose.: 112 mg/dL (07 Dec 2019 17:50)        Exam:   Gen: NAD   Neuro: Mental status stable. visual field defect  Lungs: Auscultation of the chest reveals equal bs  Abd: soft, nt/nd  Ext: warm and well perfused  Wound: appears clean and unremarkable  Antibiotics are periop      labs  CBC Full  -  ( 07 Dec 2019 10:53 )  WBC Count : 22.02 K/uL  RBC Count : 3.36 M/uL  Hemoglobin : 10.1 g/dL  Hematocrit : 30.1 %  Platelet Count - Automated : 174 K/uL  Mean Cell Volume : 89.6 fl  Mean Cell Hemoglobin : 30.1 pg  Mean Cell Hemoglobin Concentration : 33.6 gm/dL  Auto Neutrophil # : x  Auto Lymphocyte # : x  Auto Monocyte # : x  Auto Eosinophil # : x  Auto Basophil # : x  Auto Neutrophil % : x  Auto Lymphocyte % : x  Auto Monocyte % : x  Auto Eosinophil % : x  Auto Basophil % : x    12-07    140  |  101  |  6<L>  ----------------------------<  131<H>  4.0   |  29  |  0.68    Ca    8.9      07 Dec 2019 10:53  Phos  1.3     12-07  Mg     2.1     12-07    TPro  6.6  /  Alb  4.4  /  TBili  0.6  /  DBili  x   /  AST  36  /  ALT  20  /  AlkPhos  52  12-07    PT/INR - ( 07 Dec 2019 10:53 )   PT: 13.9 sec;   INR: 1.22          PTT - ( 07 Dec 2019 10:53 )  PTT:28.4 sec    The current medications were reviewed   MEDICATIONS  (STANDING):  aspirin enteric coated 81 milliGRAM(s) Oral daily  atorvastatin 80 milliGRAM(s) Oral at bedtime  clopidogrel Tablet 75 milliGRAM(s) Oral daily  dextrose 5%. 1000 milliLiter(s) (50 mL/Hr) IV Continuous <Continuous>  dextrose 50% Injectable 12.5 Gram(s) IV Push once  dextrose 50% Injectable 25 Gram(s) IV Push once  dextrose 50% Injectable 25 Gram(s) IV Push once  heparin  Injectable 5000 Unit(s) SubCutaneous every 8 hours  influenza   Vaccine 0.5 milliLiter(s) IntraMuscular once  insulin lispro (HumaLOG) corrective regimen sliding scale   SubCutaneous Before meals and at bedtime  metoprolol tartrate 12.5 milliGRAM(s) Oral every 12 hours  ondansetron Injectable 4 milliGRAM(s) IV Push once  pantoprazole    Tablet 40 milliGRAM(s) Oral before breakfast  polyethylene glycol 3350 17 Gram(s) Oral daily  sodium chloride 0.9%. 1000 milliLiter(s) (10 mL/Hr) IV Continuous <Continuous>    MEDICATIONS  (PRN):  acetaminophen   Tablet .. 650 milliGRAM(s) Oral every 6 hours PRN Mild Pain (1 - 3)  dextrose 40% Gel 15 Gram(s) Oral once PRN Blood Glucose LESS THAN 70 milliGRAM(s)/deciliter  glucagon  Injectable 1 milliGRAM(s) IntraMuscular once PRN Glucose LESS THAN 70 milligrams/deciliter  ketorolac   Injectable 15 milliGRAM(s) IV Push every 6 hours PRN Moderate Pain (4 - 6)  oxycodone    5 mG/acetaminophen 325 mG 1 Tablet(s) Oral every 6 hours PRN Severe Pain (7 - 10)       PROBLEM LIST/ ASSESSMENT:  HEALTH ISSUES - PROBLEM Dx:         Patient is a 34y old  Female who presents with a chief complaint of Anomalous RCA (07 Dec 2019 16:59) s/p repair and acute CVA          My plan includes :  -Close hemodynamic, ventilatory and drain monitoring and management per post op routine  -Monitor for arrhythmias and monitor parameters for organ perfusion  -Monitor neurologic status  -Head of the bed should remain elevated to 45 deg .   -Chest PT and IS will be encouraged  -Monitor adequacy of oxygenation and ventilation and attempt to wean oxygen  -Nutritional goals will be met using po eventually , ensure adequate caloric intake and monitor the same  -Stress ulcer and VTE prophylaxis will be achieved    -Glycemic control is satisfactory  -Electrolytes have been repleated as necessary and wound care has been carried out. Pain control has been achieved.   -Agressive physical therapy and early mobility and ambulation goals will be met   The family was updated about the course and plan    CRITICAL CARE TIME SPENT in evaluation and management, reassessments, review and interpretation of labs and x-rays, ventilator and hemodynamic management, formulating a plan and coordinating care: ___30____ MIN.  Time does not include procedural time.      CTICU ATTENDING:      		  Leonie Villatoro MD

## 2019-12-08 LAB
ALBUMIN SERPL ELPH-MCNC: 4 G/DL — SIGNIFICANT CHANGE UP (ref 3.3–5)
ALBUMIN SERPL ELPH-MCNC: 4.3 G/DL — SIGNIFICANT CHANGE UP (ref 3.3–5)
ALP SERPL-CCNC: 57 U/L — SIGNIFICANT CHANGE UP (ref 40–120)
ALP SERPL-CCNC: 71 U/L — SIGNIFICANT CHANGE UP (ref 40–120)
ALT FLD-CCNC: 17 U/L — SIGNIFICANT CHANGE UP (ref 10–45)
ALT FLD-CCNC: 20 U/L — SIGNIFICANT CHANGE UP (ref 10–45)
ANION GAP SERPL CALC-SCNC: 10 MMOL/L — SIGNIFICANT CHANGE UP (ref 5–17)
ANION GAP SERPL CALC-SCNC: 10 MMOL/L — SIGNIFICANT CHANGE UP (ref 5–17)
APTT BLD: 32 SEC — SIGNIFICANT CHANGE UP (ref 27.5–36.3)
APTT BLD: 33.7 SEC — SIGNIFICANT CHANGE UP (ref 27.5–36.3)
AST SERPL-CCNC: 28 U/L — SIGNIFICANT CHANGE UP (ref 10–40)
AST SERPL-CCNC: 34 U/L — SIGNIFICANT CHANGE UP (ref 10–40)
BILIRUB SERPL-MCNC: 0.5 MG/DL — SIGNIFICANT CHANGE UP (ref 0.2–1.2)
BILIRUB SERPL-MCNC: 0.6 MG/DL — SIGNIFICANT CHANGE UP (ref 0.2–1.2)
BUN SERPL-MCNC: 12 MG/DL — SIGNIFICANT CHANGE UP (ref 7–23)
BUN SERPL-MCNC: 9 MG/DL — SIGNIFICANT CHANGE UP (ref 7–23)
CALCIUM SERPL-MCNC: 8.6 MG/DL — SIGNIFICANT CHANGE UP (ref 8.4–10.5)
CALCIUM SERPL-MCNC: 9.3 MG/DL — SIGNIFICANT CHANGE UP (ref 8.4–10.5)
CHLORIDE SERPL-SCNC: 102 MMOL/L — SIGNIFICANT CHANGE UP (ref 96–108)
CHLORIDE SERPL-SCNC: 104 MMOL/L — SIGNIFICANT CHANGE UP (ref 96–108)
CO2 SERPL-SCNC: 28 MMOL/L — SIGNIFICANT CHANGE UP (ref 22–31)
CO2 SERPL-SCNC: 28 MMOL/L — SIGNIFICANT CHANGE UP (ref 22–31)
CREAT SERPL-MCNC: 0.62 MG/DL — SIGNIFICANT CHANGE UP (ref 0.5–1.3)
CREAT SERPL-MCNC: 0.73 MG/DL — SIGNIFICANT CHANGE UP (ref 0.5–1.3)
GLUCOSE BLDC GLUCOMTR-MCNC: 112 MG/DL — HIGH (ref 70–99)
GLUCOSE BLDC GLUCOMTR-MCNC: 114 MG/DL — HIGH (ref 70–99)
GLUCOSE SERPL-MCNC: 110 MG/DL — HIGH (ref 70–99)
GLUCOSE SERPL-MCNC: 129 MG/DL — HIGH (ref 70–99)
HCT VFR BLD CALC: 24.5 % — LOW (ref 34.5–45)
HCT VFR BLD CALC: 28.2 % — LOW (ref 34.5–45)
HCYS SERPL-MCNC: 7.6 UMOL/L — SIGNIFICANT CHANGE UP
HGB BLD-MCNC: 8 G/DL — LOW (ref 11.5–15.5)
HGB BLD-MCNC: 9.3 G/DL — LOW (ref 11.5–15.5)
INR BLD: 1.2 — HIGH (ref 0.88–1.16)
INR BLD: 1.21 — HIGH (ref 0.88–1.16)
LACTATE SERPL-SCNC: 1 MMOL/L — SIGNIFICANT CHANGE UP (ref 0.5–2)
MAGNESIUM SERPL-MCNC: 2.1 MG/DL — SIGNIFICANT CHANGE UP (ref 1.6–2.6)
MAGNESIUM SERPL-MCNC: 2.2 MG/DL — SIGNIFICANT CHANGE UP (ref 1.6–2.6)
MCHC RBC-ENTMCNC: 29.5 PG — SIGNIFICANT CHANGE UP (ref 27–34)
MCHC RBC-ENTMCNC: 30 PG — SIGNIFICANT CHANGE UP (ref 27–34)
MCHC RBC-ENTMCNC: 32.7 GM/DL — SIGNIFICANT CHANGE UP (ref 32–36)
MCHC RBC-ENTMCNC: 33 GM/DL — SIGNIFICANT CHANGE UP (ref 32–36)
MCV RBC AUTO: 90.4 FL — SIGNIFICANT CHANGE UP (ref 80–100)
MCV RBC AUTO: 91 FL — SIGNIFICANT CHANGE UP (ref 80–100)
NRBC # BLD: 0 /100 WBCS — SIGNIFICANT CHANGE UP (ref 0–0)
NRBC # BLD: 0 /100 WBCS — SIGNIFICANT CHANGE UP (ref 0–0)
PHOSPHATE SERPL-MCNC: 1.4 MG/DL — LOW (ref 2.5–4.5)
PHOSPHATE SERPL-MCNC: 2.1 MG/DL — LOW (ref 2.5–4.5)
PLATELET # BLD AUTO: 151 K/UL — SIGNIFICANT CHANGE UP (ref 150–400)
PLATELET # BLD AUTO: 203 K/UL — SIGNIFICANT CHANGE UP (ref 150–400)
POTASSIUM SERPL-MCNC: 3.8 MMOL/L — SIGNIFICANT CHANGE UP (ref 3.5–5.3)
POTASSIUM SERPL-MCNC: 4.1 MMOL/L — SIGNIFICANT CHANGE UP (ref 3.5–5.3)
POTASSIUM SERPL-SCNC: 3.8 MMOL/L — SIGNIFICANT CHANGE UP (ref 3.5–5.3)
POTASSIUM SERPL-SCNC: 4.1 MMOL/L — SIGNIFICANT CHANGE UP (ref 3.5–5.3)
PROT SERPL-MCNC: 6.1 G/DL — SIGNIFICANT CHANGE UP (ref 6–8.3)
PROT SERPL-MCNC: 6.6 G/DL — SIGNIFICANT CHANGE UP (ref 6–8.3)
PROTHROM AB SERPL-ACNC: 13.6 SEC — HIGH (ref 10–12.9)
PROTHROM AB SERPL-ACNC: 13.8 SEC — HIGH (ref 10–12.9)
RBC # BLD: 2.71 M/UL — LOW (ref 3.8–5.2)
RBC # BLD: 3.1 M/UL — LOW (ref 3.8–5.2)
RBC # FLD: 13.3 % — SIGNIFICANT CHANGE UP (ref 10.3–14.5)
RBC # FLD: 13.6 % — SIGNIFICANT CHANGE UP (ref 10.3–14.5)
SODIUM SERPL-SCNC: 140 MMOL/L — SIGNIFICANT CHANGE UP (ref 135–145)
SODIUM SERPL-SCNC: 142 MMOL/L — SIGNIFICANT CHANGE UP (ref 135–145)
WBC # BLD: 13.63 K/UL — HIGH (ref 3.8–10.5)
WBC # BLD: 15.28 K/UL — HIGH (ref 3.8–10.5)
WBC # FLD AUTO: 13.63 K/UL — HIGH (ref 3.8–10.5)
WBC # FLD AUTO: 15.28 K/UL — HIGH (ref 3.8–10.5)

## 2019-12-08 PROCEDURE — 71045 X-RAY EXAM CHEST 1 VIEW: CPT | Mod: 26

## 2019-12-08 PROCEDURE — 99292 CRITICAL CARE ADDL 30 MIN: CPT

## 2019-12-08 PROCEDURE — 99233 SBSQ HOSP IP/OBS HIGH 50: CPT

## 2019-12-08 PROCEDURE — 99291 CRITICAL CARE FIRST HOUR: CPT

## 2019-12-08 RX ORDER — FUROSEMIDE 40 MG
40 TABLET ORAL ONCE
Refills: 0 | Status: COMPLETED | OUTPATIENT
Start: 2019-12-08 | End: 2019-12-08

## 2019-12-08 RX ORDER — ALBUMIN HUMAN 25 %
250 VIAL (ML) INTRAVENOUS ONCE
Refills: 0 | Status: COMPLETED | OUTPATIENT
Start: 2019-12-08 | End: 2019-12-08

## 2019-12-08 RX ORDER — SODIUM,POTASSIUM PHOSPHATES 278-250MG
1 POWDER IN PACKET (EA) ORAL
Refills: 0 | Status: COMPLETED | OUTPATIENT
Start: 2019-12-08 | End: 2019-12-08

## 2019-12-08 RX ORDER — METOPROLOL TARTRATE 50 MG
12.5 TABLET ORAL EVERY 6 HOURS
Refills: 0 | Status: DISCONTINUED | OUTPATIENT
Start: 2019-12-08 | End: 2019-12-11

## 2019-12-08 RX ORDER — POTASSIUM CHLORIDE 20 MEQ
20 PACKET (EA) ORAL ONCE
Refills: 0 | Status: COMPLETED | OUTPATIENT
Start: 2019-12-08 | End: 2019-12-08

## 2019-12-08 RX ORDER — FUROSEMIDE 40 MG
20 TABLET ORAL ONCE
Refills: 0 | Status: DISCONTINUED | OUTPATIENT
Start: 2019-12-08 | End: 2019-12-09

## 2019-12-08 RX ADMIN — Medication 81 MILLIGRAM(S): at 11:48

## 2019-12-08 RX ADMIN — Medication 15 MILLIGRAM(S): at 05:40

## 2019-12-08 RX ADMIN — Medication 12.5 MILLIGRAM(S): at 05:19

## 2019-12-08 RX ADMIN — OXYCODONE AND ACETAMINOPHEN 1 TABLET(S): 5; 325 TABLET ORAL at 11:49

## 2019-12-08 RX ADMIN — OXYCODONE AND ACETAMINOPHEN 1 TABLET(S): 5; 325 TABLET ORAL at 21:01

## 2019-12-08 RX ADMIN — HEPARIN SODIUM 5000 UNIT(S): 5000 INJECTION INTRAVENOUS; SUBCUTANEOUS at 16:44

## 2019-12-08 RX ADMIN — Medication 1 TABLET(S): at 18:01

## 2019-12-08 RX ADMIN — HEPARIN SODIUM 5000 UNIT(S): 5000 INJECTION INTRAVENOUS; SUBCUTANEOUS at 21:46

## 2019-12-08 RX ADMIN — PANTOPRAZOLE SODIUM 40 MILLIGRAM(S): 20 TABLET, DELAYED RELEASE ORAL at 07:14

## 2019-12-08 RX ADMIN — Medication 40 MILLIGRAM(S): at 05:19

## 2019-12-08 RX ADMIN — Medication 15 MILLIGRAM(S): at 05:19

## 2019-12-08 RX ADMIN — OXYCODONE AND ACETAMINOPHEN 1 TABLET(S): 5; 325 TABLET ORAL at 12:30

## 2019-12-08 RX ADMIN — Medication 125 MILLILITER(S): at 00:22

## 2019-12-08 RX ADMIN — ONDANSETRON 4 MILLIGRAM(S): 8 TABLET, FILM COATED ORAL at 09:34

## 2019-12-08 RX ADMIN — Medication 1 TABLET(S): at 11:48

## 2019-12-08 RX ADMIN — ATORVASTATIN CALCIUM 80 MILLIGRAM(S): 80 TABLET, FILM COATED ORAL at 21:46

## 2019-12-08 RX ADMIN — Medication 20 MILLIEQUIVALENT(S): at 05:19

## 2019-12-08 RX ADMIN — Medication 1 TABLET(S): at 07:39

## 2019-12-08 RX ADMIN — POLYETHYLENE GLYCOL 3350 17 GRAM(S): 17 POWDER, FOR SOLUTION ORAL at 11:49

## 2019-12-08 RX ADMIN — Medication 12.5 MILLIGRAM(S): at 11:48

## 2019-12-08 RX ADMIN — Medication 650 MILLIGRAM(S): at 18:05

## 2019-12-08 RX ADMIN — Medication 40 MILLIGRAM(S): at 13:13

## 2019-12-08 RX ADMIN — Medication 125 MILLILITER(S): at 00:21

## 2019-12-08 RX ADMIN — Medication 12.5 MILLIGRAM(S): at 18:01

## 2019-12-08 RX ADMIN — OXYCODONE AND ACETAMINOPHEN 1 TABLET(S): 5; 325 TABLET ORAL at 20:16

## 2019-12-08 RX ADMIN — CLOPIDOGREL BISULFATE 75 MILLIGRAM(S): 75 TABLET, FILM COATED ORAL at 11:48

## 2019-12-08 RX ADMIN — HEPARIN SODIUM 5000 UNIT(S): 5000 INJECTION INTRAVENOUS; SUBCUTANEOUS at 05:18

## 2019-12-08 NOTE — PROGRESS NOTE ADULT - SUBJECTIVE AND OBJECTIVE BOX
INTERVAL HPI/OVERNIGHT EVENTS:    12/5 Repair, anomalous right coronary artery - transposition of right coronary artery  EF: 65%    33yo Female [place] resident with no known PMHx (+)FamHx congenital heart disease with sxs dec exercise tolerance/RILEY and chest tightness w/palpitations    Coronary CTa: malignant anomalous RCA origin from the left coronary sinus and coursing between the pulmonary artery trunk and the anterior aortic trunk.     Elective procedure 12/5  no intraop blood products given   extubated 12/6 - reported visual field cut. Code stroke called    Neuro consulted -   CT Head 12/6: Acute nonhemorrhagic right  PCA territory infarct.  CT perfusion 12/6: Acute ischemia along the right PCA territory. Abnormal perfusion with RAPID calculated mismatch volume of -2 ml and mismatch ratio is 0.7.    CTa Head/Neck 12/6: Occulusion of the P4 segment of the right PCA. Normal CTa of the neck.    Left upper peripheral field cut ongoing - seen with neuro attending today  patient OOB in chair     No acute events reported overnight     ICU Vital Signs Last 24 Hrs  T(C): 37.1 (08 Dec 2019 09:43), Max: 37.6 (07 Dec 2019 17:44)  T(F): 98.8 (08 Dec 2019 09:43), Max: 99.7 (07 Dec 2019 17:44)  HR: 102 (08 Dec 2019 12:00) (92 - 112) sinus tach  BP: 130/82 (08 Dec 2019 12:00) (107/79 - 130/82)  BP(mean): 101 (08 Dec 2019 12:00) (88 - 101)  SpO2: 99% (08 Dec 2019 12:00) (94% - 99%) 2L NC    Qtts: None    I&O's Summary    07 Dec 2019 07:01  -  08 Dec 2019 07:00  --------------------------------------------------------  IN: 1882.5 mL / OUT: 2630 mL / NET: -747.5 mL    08 Dec 2019 07:01  -  08 Dec 2019 13:23  --------------------------------------------------------  IN: 500 mL / OUT: 220 mL / NET: 280 mL    Physical Exam    Heart tachy/regular - no rub/gallop  Lungs - CTA anterior - some splinting noted - poor inspiratory effort  Abd - (+)BS soft NTND (-)r/r/g  Ext - warm to touch; no cyanosis/clubbing  Neuro - alert/interactive - non-focal - left upper peripheral field cut  Skin - no rash     LABS:                        9.3    15.28 )-----------( 203      ( 08 Dec 2019 13:11 )             28.2     12-08    140  |  102  |  9   ----------------------------<  129<H>  3.8   |  28  |  0.62    Ca    8.6      08 Dec 2019 03:37  Phos  1.4     12-08  Mg     2.1     12-08    TPro  6.1  /  Alb  4.0  /  TBili  0.5  /  DBili  x   /  AST  28  /  ALT  17  /  AlkPhos  57  12-08    PT/INR - ( 08 Dec 2019 03:37 )   PT: 13.8 sec;   INR: 1.21     PTT - ( 08 Dec 2019 03:37 )  PTT:33.7 sec    ABG - ( 07 Dec 2019 03:16 ) 7.45/42/120/98    RADIOLOGY & ADDITIONAL STUDIES: reviewed    patient with malignant anomalous RCA now s/p repair with post-op visual field cut and imaging demonstrating Occlusion of the P4 segment of the right PCA    1. CV  hemodynamically stable  sinus   ASA/Plavix  Metoprolol 12.5 q6h    2. Pulm   poor inspiratory effort  incentive spirometry only 250cc - encouraged patient to cont to work on it  bedside sono - atelectasis   lasix dosing given     3. Neuro   peripheral left upper field cut  per d/w neuro attending - on discharge can set up formal peripheral vision assessment (MEETH) and assessment for prism glasses    bowel regimen  DVT and GI prophylaxis    d/w patient/staff/Neuro attending and CTS    I have spent/provided stated minutes of critical care time to this patient: 90 min

## 2019-12-08 NOTE — PROGRESS NOTE ADULT - SUBJECTIVE AND OBJECTIVE BOX
Neurology Stroke Progress Note    INTERVAL HPI/OVERNIGHT EVENTS:  Patient seen and examined. No acute complaints at this time. Friend at bedside.     MEDICATIONS  (STANDING):  aspirin enteric coated 81 milliGRAM(s) Oral daily  atorvastatin 80 milliGRAM(s) Oral at bedtime  clopidogrel Tablet 75 milliGRAM(s) Oral daily  furosemide   Injectable 20 milliGRAM(s) IV Push once  heparin  Injectable 5000 Unit(s) SubCutaneous every 8 hours  influenza   Vaccine 0.5 milliLiter(s) IntraMuscular once  metoprolol tartrate 12.5 milliGRAM(s) Oral every 6 hours  pantoprazole    Tablet 40 milliGRAM(s) Oral before breakfast  polyethylene glycol 3350 17 Gram(s) Oral daily  potassium acid phosphate/sodium acid phosphate tablet (K-PHOS No. 2) 1 Tablet(s) Oral three times a day with meals  sodium chloride 0.9%. 1000 milliLiter(s) (10 mL/Hr) IV Continuous <Continuous>    MEDICATIONS  (PRN):  acetaminophen   Tablet .. 650 milliGRAM(s) Oral every 6 hours PRN Mild Pain (1 - 3)  glucagon  Injectable 1 milliGRAM(s) IntraMuscular once PRN Glucose LESS THAN 70 milligrams/deciliter  oxycodone    5 mG/acetaminophen 325 mG 1 Tablet(s) Oral every 6 hours PRN Severe Pain (7 - 10)    Allergies  No Known Allergies    Intolerances  lactose (Diarrhea)    ROS: As per HPI, otherwise negative    Vital Signs Last 24 Hrs  T(C): 36.9 (08 Dec 2019 14:00), Max: 37.6 (07 Dec 2019 17:44)  T(F): 98.4 (08 Dec 2019 14:00), Max: 99.7 (07 Dec 2019 17:44)  HR: 102 (08 Dec 2019 15:00) (92 - 110)  BP: 115/71 (08 Dec 2019 15:00) (107/79 - 130/82)  BP(mean): 92 (08 Dec 2019 15:00) (87 - 101)  RR: 20 (08 Dec 2019 15:00) (16 - 22)  SpO2: 97% (08 Dec 2019 15:00) (94% - 99%)    Physical exam:  General: Awake and alert, sitting comfortably in chair, appears in no acute distress.    Neurologic:  Mental status: Awake and alert. Speech is fluent. Follows commands. Attention/concentration intact. No dysarthria, no aphasia.  Cranial nerves:   II: Left peripheral upper quadrant visual field deficit.   III, IV, VI: EOMI without nystagmus.  V:  V1-V3 sensation intact.   VII: Face symmetric with normal eye closure and smile. No facial droop.   Motor: No pronator drift. Normal bulk and tone, strength 5/5 in b/l UE and LE,  strength 5/5.   Sensation: Grossly intact to light touch. No neglect.  Gait: Deferred.       LABS:                        9.3    15.28 )-----------( 203      ( 08 Dec 2019 13:11 )             28.2     12-08    142  |  104  |  12  ----------------------------<  110<H>  4.1   |  28  |  0.73    Ca    9.3      08 Dec 2019 13:11  Phos  2.1     12-08  Mg     2.2     12-08    TPro  6.6  /  Alb  4.3  /  TBili  0.6  /  DBili  x   /  AST  34  /  ALT  20  /  AlkPhos  71  12-08    PT/INR - ( 08 Dec 2019 13:11 )   PT: 13.6 sec;   INR: 1.20         PTT - ( 08 Dec 2019 13:11 )  PTT:32.0 sec      RADIOLOGY & ADDITIONAL TESTS:  - CT head 12/6    < from: CT Brain Stroke Protocol (12.06.19 @ 12:00) >  IMPRESSION:   Acute nonhemorrhagic right  PCA territory infarct.    - CTA head/neck 12/6    < from: CT Angio Head w/ IV Cont (12.06.19 @ 12:13) >  IMPRESSION: Occulusion of the P4 segment of the right PCA    - CT/P 12/6   < from: CT Perfusion w/ Maps w/ IV Cont (12.06.19 @ 12:13) >    IMPRESSION: Acute ischemia along the right PCA territory. Abnormal   perfusion with RAPID calculated mismatch volume of -2 ml and mismatch   ratio is 0.7.    Assessment and Plan  34F w/ no PMHx, from [place], s/p transposition of right coronary artery 12/5 presented with new onset blurry vision. SC called 12/6, patient reported bilateral blurry vision. Initially, she thought it was because she did not have her glasses on, but when she used them, her vision remained blurry and began to worsen. Stroke imaging -- CT head significant for acute right PCA territory infarct, CTA revealed occlusion of the P4 segment of the right PCA, CT/P with acute ischemia along right PCA territory. Neuro exam 12/8 consistent with left upper quadrant visual field cut. Recommend follow - up with outpatient neuro-opthalmology to assess visual fields.     1)Secondary stroke prevention  -Continue ASA 81mg PO daily and Plavix 75mg PO daily   -Continue Atorvastatin 80mg PO daily     2) Further workup  - Follow -up with outpatient neuro-opthalmology for visual field testing and prism glasses.   - Encourage head turning for better vision of peripheral fields.  - No need for further stroke workup/ cerebral imaging at this time.     - Inpatient mgmt per CTICU    3)DVT prophylaxis   -Heparin SQ and SCDs

## 2019-12-08 NOTE — PROGRESS NOTE ADULT - ATTENDING COMMENTS
Patient seen and examined with PA.  Agree with above.  Patient c/o blurry vision on left side.  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  She's possibly making up answers for identification in that quadrant.  She would benefit from formal visual field testing with consideration for prisms to direct the vision.  Answered questions regarding her vision and possible recovery.  Continue antiplatelet and statin for secondary stroke prevention.

## 2019-12-09 LAB
ALBUMIN SERPL ELPH-MCNC: 4.1 G/DL — SIGNIFICANT CHANGE UP (ref 3.3–5)
ALP SERPL-CCNC: 65 U/L — SIGNIFICANT CHANGE UP (ref 40–120)
ALT FLD-CCNC: 19 U/L — SIGNIFICANT CHANGE UP (ref 10–45)
ANION GAP SERPL CALC-SCNC: 10 MMOL/L — SIGNIFICANT CHANGE UP (ref 5–17)
APTT BLD: 41.8 SEC — HIGH (ref 27.5–36.3)
AST SERPL-CCNC: 26 U/L — SIGNIFICANT CHANGE UP (ref 10–40)
BILIRUB SERPL-MCNC: 0.4 MG/DL — SIGNIFICANT CHANGE UP (ref 0.2–1.2)
BUN SERPL-MCNC: 13 MG/DL — SIGNIFICANT CHANGE UP (ref 7–23)
CALCIUM SERPL-MCNC: 9.3 MG/DL — SIGNIFICANT CHANGE UP (ref 8.4–10.5)
CHLORIDE SERPL-SCNC: 102 MMOL/L — SIGNIFICANT CHANGE UP (ref 96–108)
CO2 SERPL-SCNC: 30 MMOL/L — SIGNIFICANT CHANGE UP (ref 22–31)
CREAT SERPL-MCNC: 0.89 MG/DL — SIGNIFICANT CHANGE UP (ref 0.5–1.3)
GLUCOSE SERPL-MCNC: 106 MG/DL — HIGH (ref 70–99)
HCT VFR BLD CALC: 29 % — LOW (ref 34.5–45)
HGB BLD-MCNC: 9.4 G/DL — LOW (ref 11.5–15.5)
INR BLD: 1.15 — SIGNIFICANT CHANGE UP (ref 0.88–1.16)
MAGNESIUM SERPL-MCNC: 2 MG/DL — SIGNIFICANT CHANGE UP (ref 1.6–2.6)
MCHC RBC-ENTMCNC: 29.6 PG — SIGNIFICANT CHANGE UP (ref 27–34)
MCHC RBC-ENTMCNC: 32.4 GM/DL — SIGNIFICANT CHANGE UP (ref 32–36)
MCV RBC AUTO: 91.2 FL — SIGNIFICANT CHANGE UP (ref 80–100)
NRBC # BLD: 0 /100 WBCS — SIGNIFICANT CHANGE UP (ref 0–0)
PHOSPHATE SERPL-MCNC: 3.3 MG/DL — SIGNIFICANT CHANGE UP (ref 2.5–4.5)
PLATELET # BLD AUTO: 241 K/UL — SIGNIFICANT CHANGE UP (ref 150–400)
POTASSIUM SERPL-MCNC: 3.9 MMOL/L — SIGNIFICANT CHANGE UP (ref 3.5–5.3)
POTASSIUM SERPL-SCNC: 3.9 MMOL/L — SIGNIFICANT CHANGE UP (ref 3.5–5.3)
PROT SERPL-MCNC: 6.3 G/DL — SIGNIFICANT CHANGE UP (ref 6–8.3)
PROTHROM AB SERPL-ACNC: 13.1 SEC — HIGH (ref 10–12.9)
RBC # BLD: 3.18 M/UL — LOW (ref 3.8–5.2)
RBC # FLD: 13 % — SIGNIFICANT CHANGE UP (ref 10.3–14.5)
SODIUM SERPL-SCNC: 142 MMOL/L — SIGNIFICANT CHANGE UP (ref 135–145)
WBC # BLD: 13.23 K/UL — HIGH (ref 3.8–10.5)
WBC # FLD AUTO: 13.23 K/UL — HIGH (ref 3.8–10.5)

## 2019-12-09 PROCEDURE — 71045 X-RAY EXAM CHEST 1 VIEW: CPT | Mod: 26

## 2019-12-09 RX ORDER — POTASSIUM CHLORIDE 20 MEQ
20 PACKET (EA) ORAL ONCE
Refills: 0 | Status: COMPLETED | OUTPATIENT
Start: 2019-12-09 | End: 2019-12-09

## 2019-12-09 RX ORDER — SODIUM CHLORIDE 9 MG/ML
3 INJECTION INTRAMUSCULAR; INTRAVENOUS; SUBCUTANEOUS EVERY 8 HOURS
Refills: 0 | Status: DISCONTINUED | OUTPATIENT
Start: 2019-12-09 | End: 2019-12-11

## 2019-12-09 RX ADMIN — OXYCODONE AND ACETAMINOPHEN 1 TABLET(S): 5; 325 TABLET ORAL at 09:07

## 2019-12-09 RX ADMIN — OXYCODONE AND ACETAMINOPHEN 1 TABLET(S): 5; 325 TABLET ORAL at 09:37

## 2019-12-09 RX ADMIN — HEPARIN SODIUM 5000 UNIT(S): 5000 INJECTION INTRAVENOUS; SUBCUTANEOUS at 13:43

## 2019-12-09 RX ADMIN — OXYCODONE AND ACETAMINOPHEN 1 TABLET(S): 5; 325 TABLET ORAL at 21:22

## 2019-12-09 RX ADMIN — Medication 81 MILLIGRAM(S): at 12:01

## 2019-12-09 RX ADMIN — OXYCODONE AND ACETAMINOPHEN 1 TABLET(S): 5; 325 TABLET ORAL at 16:20

## 2019-12-09 RX ADMIN — ATORVASTATIN CALCIUM 80 MILLIGRAM(S): 80 TABLET, FILM COATED ORAL at 21:15

## 2019-12-09 RX ADMIN — OXYCODONE AND ACETAMINOPHEN 1 TABLET(S): 5; 325 TABLET ORAL at 02:26

## 2019-12-09 RX ADMIN — Medication 12.5 MILLIGRAM(S): at 12:01

## 2019-12-09 RX ADMIN — HEPARIN SODIUM 5000 UNIT(S): 5000 INJECTION INTRAVENOUS; SUBCUTANEOUS at 05:36

## 2019-12-09 RX ADMIN — PANTOPRAZOLE SODIUM 40 MILLIGRAM(S): 20 TABLET, DELAYED RELEASE ORAL at 05:36

## 2019-12-09 RX ADMIN — OXYCODONE AND ACETAMINOPHEN 1 TABLET(S): 5; 325 TABLET ORAL at 03:00

## 2019-12-09 RX ADMIN — SODIUM CHLORIDE 3 MILLILITER(S): 9 INJECTION INTRAMUSCULAR; INTRAVENOUS; SUBCUTANEOUS at 22:03

## 2019-12-09 RX ADMIN — Medication 12.5 MILLIGRAM(S): at 05:36

## 2019-12-09 RX ADMIN — CLOPIDOGREL BISULFATE 75 MILLIGRAM(S): 75 TABLET, FILM COATED ORAL at 12:01

## 2019-12-09 RX ADMIN — OXYCODONE AND ACETAMINOPHEN 1 TABLET(S): 5; 325 TABLET ORAL at 22:30

## 2019-12-09 RX ADMIN — Medication 12.5 MILLIGRAM(S): at 17:29

## 2019-12-09 RX ADMIN — SODIUM CHLORIDE 3 MILLILITER(S): 9 INJECTION INTRAMUSCULAR; INTRAVENOUS; SUBCUTANEOUS at 13:43

## 2019-12-09 RX ADMIN — OXYCODONE AND ACETAMINOPHEN 1 TABLET(S): 5; 325 TABLET ORAL at 15:30

## 2019-12-09 RX ADMIN — Medication 20 MILLIEQUIVALENT(S): at 05:55

## 2019-12-09 RX ADMIN — Medication 12.5 MILLIGRAM(S): at 00:30

## 2019-12-09 RX ADMIN — Medication 650 MILLIGRAM(S): at 23:41

## 2019-12-09 RX ADMIN — HEPARIN SODIUM 5000 UNIT(S): 5000 INJECTION INTRAVENOUS; SUBCUTANEOUS at 21:15

## 2019-12-09 RX ADMIN — Medication 12.5 MILLIGRAM(S): at 23:59

## 2019-12-09 NOTE — OCCUPATIONAL THERAPY INITIAL EVALUATION ADULT - ADDITIONAL COMMENTS
Patient lives and works in [facility] however will be staying with her parents in [place] during recovery in which has no stairs to navigate. Patient reports independence PTA and denies use of AE/AD.

## 2019-12-09 NOTE — OCCUPATIONAL THERAPY INITIAL EVALUATION ADULT - PLANNED THERAPY INTERVENTIONS, OT EVAL
transfer training/cognitive, visual perceptual/ADL retraining/balance training/bed mobility training

## 2019-12-09 NOTE — PROGRESS NOTE ADULT - SUBJECTIVE AND OBJECTIVE BOX
Patient discussed on morning rounds with Dr. Wooten     Operation / Date: 12/5/19 - Repair o    SUBJECTIVE ASSESSMENT:  34y Female         Vital Signs Last 24 Hrs  T(C): 36.9 (09 Dec 2019 08:50), Max: 37.5 (08 Dec 2019 17:51)  T(F): 98.4 (09 Dec 2019 08:50), Max: 99.5 (08 Dec 2019 17:51)  HR: 112 (09 Dec 2019 11:55) (90 - 112)  BP: 117/75 (09 Dec 2019 11:55) (104/67 - 126/74)  BP(mean): 95 (09 Dec 2019 11:55) (84 - 99)  RR: 17 (09 Dec 2019 11:55) (16 - 22)  SpO2: 94% (09 Dec 2019 11:55) (91% - 97%)  I&O's Detail    08 Dec 2019 07:01  -  09 Dec 2019 07:00  --------------------------------------------------------  IN:    Oral Fluid: 440 mL    sodium chloride 0.9%: 70 mL  Total IN: 510 mL    OUT:    Indwelling Catheter - Urethral: 690 mL  Total OUT: 690 mL    Total NET: -180 mL      09 Dec 2019 07:01  -  09 Dec 2019 13:39  --------------------------------------------------------  IN:    Oral Fluid: 240 mL  Total IN: 240 mL    OUT:  Total OUT: 0 mL    Total NET: 240 mL          CHEST TUBE:  Yes/No. AIR LEAKS: Yes/No. Suction / H2O SEAL.   ZEYNEP DRAIN:  Yes/No.  EPICARDIAL WIRES: Yes/No.  TIE DOWNS: Yes/No.  BURGESS: Yes/No.    PHYSICAL EXAM:    General:     Neurological:    Cardiovascular:    Respiratory:    Gastrointestinal:    Extremities:    Vascular:    Incision Sites:    LABS:                        9.4    13.23 )-----------( 241      ( 09 Dec 2019 01:01 )             29.0       COUMADIN:  Yes/No. REASON: .    PT/INR - ( 09 Dec 2019 01:01 )   PT: 13.1 sec;   INR: 1.15          PTT - ( 09 Dec 2019 01:01 )  PTT:41.8 sec    12-09    142  |  102  |  13  ----------------------------<  106<H>  3.9   |  30  |  0.89    Ca    9.3      09 Dec 2019 01:01  Phos  3.3     12-09  Mg     2.0     12-09    TPro  6.3  /  Alb  4.1  /  TBili  0.4  /  DBili  x   /  AST  26  /  ALT  19  /  AlkPhos  65  12-09          MEDICATIONS  (STANDING):  aspirin enteric coated 81 milliGRAM(s) Oral daily  atorvastatin 80 milliGRAM(s) Oral at bedtime  clopidogrel Tablet 75 milliGRAM(s) Oral daily  heparin  Injectable 5000 Unit(s) SubCutaneous every 8 hours  influenza   Vaccine 0.5 milliLiter(s) IntraMuscular once  metoprolol tartrate 12.5 milliGRAM(s) Oral every 6 hours  pantoprazole    Tablet 40 milliGRAM(s) Oral before breakfast  polyethylene glycol 3350 17 Gram(s) Oral daily  sodium chloride 0.9% lock flush 3 milliLiter(s) IV Push every 8 hours    MEDICATIONS  (PRN):  acetaminophen   Tablet .. 650 milliGRAM(s) Oral every 6 hours PRN Mild Pain (1 - 3)  glucagon  Injectable 1 milliGRAM(s) IntraMuscular once PRN Glucose LESS THAN 70 milligrams/deciliter  oxycodone    5 mG/acetaminophen 325 mG 1 Tablet(s) Oral every 6 hours PRN Severe Pain (7 - 10)        RADIOLOGY & ADDITIONAL TESTS: Patient discussed on morning rounds with Dr. Wooten     Operation / Date: 12/5/19 - Repair of anomalous RCA, transposition of RCA     SUBJECTIVE ASSESSMENT:   Pt is feeling okay this morning. She has been having some pain but feels it is well controlled. Has been ambulating without difficulty. Denies any CP, palpitations, SOB, wheezing, abdominal pain, N/v/D/C, fevers or chills at this time.     Vital Signs Last 24 Hrs  T(C): 36.9 (09 Dec 2019 08:50), Max: 37.5 (08 Dec 2019 17:51)  T(F): 98.4 (09 Dec 2019 08:50), Max: 99.5 (08 Dec 2019 17:51)  HR: 112 (09 Dec 2019 11:55) (90 - 112)  BP: 117/75 (09 Dec 2019 11:55) (104/67 - 126/74)  BP(mean): 95 (09 Dec 2019 11:55) (84 - 99)  RR: 17 (09 Dec 2019 11:55) (16 - 22)  SpO2: 94% (09 Dec 2019 11:55) (91% - 97%)  I&O's Detail    08 Dec 2019 07:01  -  09 Dec 2019 07:00  --------------------------------------------------------  IN:    Oral Fluid: 440 mL    sodium chloride 0.9%: 70 mL  Total IN: 510 mL    OUT:    Indwelling Catheter - Urethral: 690 mL  Total OUT: 690 mL    Total NET: -180 mL      09 Dec 2019 07:01  -  09 Dec 2019 13:39  --------------------------------------------------------  IN:    Oral Fluid: 240 mL  Total IN: 240 mL    OUT:  Total OUT: 0 mL    Total NET: 240 mL    CHEST TUBE:  no  ZEYNEP DRAIN: no  EPICARDIAL WIRES: Yes.  TIE DOWNS: Yes.  BURGESS: no.    PHYSICAL EXAM:  General: well appearing sitting up in the chair in NAD   Neurological: EOMI. Peripheral vision is normal. Unable to visualize the superior portion of her vision with focusing on an object.  AOx3. Motor skills grossly intact  Cardiovascular: Normal S1/S2. RRR. No M/R/G.  Respiratory: Lungs CTA b/l. No W/R/R.  Gastrointestinal: +BS 4 quadrants Soft. NT. ND.  Extremities: Strength 5/5 b/l upper/lower extremities. Sensation intact upper/lower extremities b/l. No edema. No calf tenderness.  Vascular: Radial 2+ b/l. DP 2+ b/l.   Incision Sites: Sternotomy incisions without sternal clicks, erythema, purulence, or ecchymosis.       LABS:                        9.4    13.23 )-----------( 241      ( 09 Dec 2019 01:01 )             29.0       COUMADIN:  no    PT/INR - ( 09 Dec 2019 01:01 )   PT: 13.1 sec;   INR: 1.15          PTT - ( 09 Dec 2019 01:01 )  PTT:41.8 sec    12-09    142  |  102  |  13  ----------------------------<  106<H>  3.9   |  30  |  0.89    Ca    9.3      09 Dec 2019 01:01  Phos  3.3     12-09  Mg     2.0     12-09    TPro  6.3  /  Alb  4.1  /  TBili  0.4  /  DBili  x   /  AST  26  /  ALT  19  /  AlkPhos  65  12-09          MEDICATIONS  (STANDING):  aspirin enteric coated 81 milliGRAM(s) Oral daily  atorvastatin 80 milliGRAM(s) Oral at bedtime  clopidogrel Tablet 75 milliGRAM(s) Oral daily  heparin  Injectable 5000 Unit(s) SubCutaneous every 8 hours  influenza   Vaccine 0.5 milliLiter(s) IntraMuscular once  metoprolol tartrate 12.5 milliGRAM(s) Oral every 6 hours  pantoprazole    Tablet 40 milliGRAM(s) Oral before breakfast  polyethylene glycol 3350 17 Gram(s) Oral daily  sodium chloride 0.9% lock flush 3 milliLiter(s) IV Push every 8 hours    MEDICATIONS  (PRN):  acetaminophen   Tablet .. 650 milliGRAM(s) Oral every 6 hours PRN Mild Pain (1 - 3)  glucagon  Injectable 1 milliGRAM(s) IntraMuscular once PRN Glucose LESS THAN 70 milligrams/deciliter  oxycodone    5 mG/acetaminophen 325 mG 1 Tablet(s) Oral every 6 hours PRN Severe Pain (7 - 10)        RADIOLOGY & ADDITIONAL TESTS:  < from: Xray Chest 1 View- PORTABLE-Routine (12.09.19 @ 04:01) >  Findings/  impression: Interim removal of right internal jugular line. Heart size   within normal limits, status post median sternotomy. Stable lung   pathology and bony structures.    < end of copied text >

## 2019-12-09 NOTE — PROGRESS NOTE ADULT - ASSESSMENT
This is a 35 y/o F with no significant medical history but with a strong family history of congential heart disease. 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 She was then consulted by Dr. Wooten for CT surgery and deemed a surgical candidate. She presents today for elective surgery. She was referred to Dr. Wooten for surgical evaluation and on 12/5/19 she underwent repair of anomalous RCA, transposition of the great arteries. The OR course was uncomplicated. Pt was extubated on POD#1 and in the morning she was noted to have visual deficits and a stroke code was called. A head CT showed R PCA noted and neuro was consulted. Recommended no intervention at this time. On POD#2-3 patient remained stable and mabulated. By POD#4 she was stable and was transferred to .       Plan:    Neurovascular:   -No delirium.  -Pain well controlled with current regimen.  -PRN's: Acetaminophen 650mg Q6 hours (for mild pain), Percocet Q 6 hours (for severe pain)     Cardiovascular: POD #4 Repair of anomalous RCA, transposition of RCA  -Hemodynamically stable. HR controlled.  -Monitor: BP, HR, tele  -CAD: Plavix, ASA, Metoprolol tartrate 12.5mg Q6 hours, Atorvastatin 80mg QHS     Respiratory:   -Pt sating well on RA  -Encourage use of IS 10x / hr while awake and ambulating frequently   -CXR: Stable and unchanged from previous. No obvious PTX.     GI: Stable.  -PPX: Pantoprazole 40mg QAM   -PO Diet: DASH   -Miralax (constipation)     Renal / :  -Monitor renal function: BUN/Cr 13/0.89  -Monitor I/O's.    Endocrine:  no hx of DM or thyroid dx  -A1c: 5.1  -TSH: 0.89    Hematologic:  -CBC: H/H- 9.4/29    ID:  -Temperature: afebrile  -CBC: WBC- 13.23  -Observe for SIRS/Sepsis Syndrome.    Prophylaxis:  -DVT prophylaxis with 5000 SubQ Heparin q8h.  -SCD's    Disposition:  -Discharge home once patient is medically ready This is a 33 y/o F with no significant medical history but with a strong family history of congential heart disease. 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 She was then consulted by Dr. Wooten for CT surgery and deemed a surgical candidate. She was referred to Dr. Wooten for surgical evaluation and on 12/5/19 she underwent elective repair of anomalous RCA, transposition of the great arteries. The OR course was uncomplicated. Pt was extubated on POD#1 and in the morning she was noted to have visual deficits and a stroke code was called. A head CT showed R PCA noted and neuro was consulted. Recommended no intervention at this time. On POD#2-3 patient remained stable and mabulated. By POD#4 she was stable and was transferred to .       Plan:    Neurovascular:   -No delirium.  -Pain well controlled with current regimen.  -PRN's: Acetaminophen 650mg Q6 hours (for mild pain), Percocet Q 6 hours (for severe pain)     Cardiovascular: POD #4 Repair of anomalous RCA, transposition of RCA  -Hemodynamically stable. HR controlled.  -Monitor: BP, HR, tele  -CAD: Plavix, ASA, Metoprolol tartrate 12.5mg Q6 hours, Atorvastatin 80mg QHS     Respiratory:   -Pt sating well on RA  -Encourage use of IS 10x / hr while awake and ambulating frequently   -CXR: Stable and unchanged from previous. No obvious PTX.     GI: Stable.  -PPX: Pantoprazole 40mg QAM   -PO Diet: DASH   -Miralax (constipation)     Renal / :  -Monitor renal function: BUN/Cr 13/0.89  -Monitor I/O's.    Endocrine:  no hx of DM or thyroid dx  -A1c: 5.1  -TSH: 0.89    Hematologic:  -CBC: H/H- 9.4/29    ID:  -Temperature: afebrile  -CBC: WBC- 13.23  -Observe for SIRS/Sepsis Syndrome.    Prophylaxis:  -DVT prophylaxis with 5000 SubQ Heparin q8h.  -SCD's    Disposition:  -Discharge home once patient is medically ready This is a 33 y/o F with no significant medical history but with a strong family history of congential heart disease. 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 She was then consulted by Dr. Wooten for CT surgery and deemed a surgical candidate. She was referred to Dr. Wooten for surgical evaluation and on 12/5/19 she underwent elective repair of anomalous RCA, transposition of the great arteries. The OR course was uncomplicated. Pt was extubated on POD#1 and in the morning she was noted to have visual deficits and a stroke code was called. A head CT showed R PCA noted and neuro was consulted. Recommended no intervention at this time. On POD#2-3 patient remained stable and mabulated. By POD#4 she was stable and was transferred to .       Plan:    Neurovascular: CVA while in the ICU with residual visual changes  -CVA: neurology following, no intervention at this time. Ophth consulted, f/u with recs. Residual visual deficits in superior portion of her visual fields, Pt needs to get Prism glasses outpatient at [facility]- will need an appointment    -No delirium.  -Pain well controlled with current regimen.  -PRN's: Acetaminophen 650mg Q6 hours (for mild pain), Percocet Q 6 hours (for severe pain)     Cardiovascular: POD #4 Repair of anomalous RCA, transposition of RCA  -Hemodynamically stable. HR controlled.  -Monitor: BP, HR, tele  -CAD: Plavix, ASA, Metoprolol tartrate 12.5mg Q6 hours, Atorvastatin 80mg QHS     Respiratory:   -Pt sating well on RA  -Encourage use of IS 10x / hr while awake and ambulating frequently   -CXR: Stable and unchanged from previous. No obvious PTX.     GI: Stable.  -PPX: Pantoprazole 40mg QAM   -PO Diet: DASH   -Miralax (constipation)     Renal / :  -Monitor renal function: BUN/Cr 13/0.89  -Monitor I/O's.    Endocrine:  no hx of DM or thyroid dx  -A1c: 5.1  -TSH: 0.89    Hematologic:  -CBC: H/H- 9.4/29    ID:  -Temperature: afebrile  -CBC: WBC- 13.23  -Observe for SIRS/Sepsis Syndrome.    Prophylaxis:  -DVT prophylaxis with 5000 SubQ Heparin q8h.  -SCD's    Disposition:  -Discharge home once patient is medically ready

## 2019-12-09 NOTE — OCCUPATIONAL THERAPY INITIAL EVALUATION ADULT - STANDING BALANCE: DYNAMIC, REHAB EVAL
Patient ambulated 150ft with BUE suport on portable monitor transitioning to unilateral HHA longterm, requiring supervision. Patient noted with narrow JONY and decreased desiree, 2 minor LOB requiring Jenny to correct./fair balance

## 2019-12-09 NOTE — OCCUPATIONAL THERAPY INITIAL EVALUATION ADULT - MANUAL MUSCLE TESTING RESULTS, REHAB EVAL
BUE at least 3+/5 as observed functionally against gravity and during transfers/grossly assessed due to

## 2019-12-09 NOTE — OCCUPATIONAL THERAPY INITIAL EVALUATION ADULT - VISUAL ACUITY
Patient wears glasses, worn during eval, denies diplopia however c/o blurry vision despite use of glasses. Patient able to correctly read clock ~10ft away.

## 2019-12-09 NOTE — OCCUPATIONAL THERAPY INITIAL EVALUATION ADULT - GENERAL OBSERVATIONS, REHAB EVAL
Patient right hand dominant. Chart reviewed, SIERRA Ramirez cleared patient for OT evaluation. Patient received ambulating in hallway with PT, NAD, +IV heplock, +tele, +TPM.

## 2019-12-09 NOTE — OCCUPATIONAL THERAPY INITIAL EVALUATION ADULT - MD ORDER
34F s/p transposition of right coronary artery 12/5 presented with new onset blurry vision. SC called 12/6, patient reported bilateral blurry vision. Initially, she thought it was because she did not have her glasses on, but when she used them, her vision remained blurry and began to worsen. Stroke imaging -- CT head significant for acute right PCA territory infarct, CTA revealed occlusion of the P4 segment of the right PCA, CT/P with acute ischemia along right PCA territory.

## 2019-12-10 LAB
ANION GAP SERPL CALC-SCNC: 10 MMOL/L — SIGNIFICANT CHANGE UP (ref 5–17)
BUN SERPL-MCNC: 13 MG/DL — SIGNIFICANT CHANGE UP (ref 7–23)
CALCIUM SERPL-MCNC: 9 MG/DL — SIGNIFICANT CHANGE UP (ref 8.4–10.5)
CHLORIDE SERPL-SCNC: 103 MMOL/L — SIGNIFICANT CHANGE UP (ref 96–108)
CO2 SERPL-SCNC: 26 MMOL/L — SIGNIFICANT CHANGE UP (ref 22–31)
CREAT SERPL-MCNC: 0.75 MG/DL — SIGNIFICANT CHANGE UP (ref 0.5–1.3)
GLUCOSE SERPL-MCNC: 115 MG/DL — HIGH (ref 70–99)
HCT VFR BLD CALC: 31.5 % — LOW (ref 34.5–45)
HGB BLD-MCNC: 10.5 G/DL — LOW (ref 11.5–15.5)
MAGNESIUM SERPL-MCNC: 2 MG/DL — SIGNIFICANT CHANGE UP (ref 1.6–2.6)
MCHC RBC-ENTMCNC: 29.8 PG — SIGNIFICANT CHANGE UP (ref 27–34)
MCHC RBC-ENTMCNC: 33.3 GM/DL — SIGNIFICANT CHANGE UP (ref 32–36)
MCV RBC AUTO: 89.5 FL — SIGNIFICANT CHANGE UP (ref 80–100)
NRBC # BLD: 0 /100 WBCS — SIGNIFICANT CHANGE UP (ref 0–0)
PLATELET # BLD AUTO: 325 K/UL — SIGNIFICANT CHANGE UP (ref 150–400)
POTASSIUM SERPL-MCNC: 3.8 MMOL/L — SIGNIFICANT CHANGE UP (ref 3.5–5.3)
POTASSIUM SERPL-SCNC: 3.8 MMOL/L — SIGNIFICANT CHANGE UP (ref 3.5–5.3)
RBC # BLD: 3.52 M/UL — LOW (ref 3.8–5.2)
RBC # FLD: 12.7 % — SIGNIFICANT CHANGE UP (ref 10.3–14.5)
SODIUM SERPL-SCNC: 139 MMOL/L — SIGNIFICANT CHANGE UP (ref 135–145)
WBC # BLD: 10.96 K/UL — HIGH (ref 3.8–10.5)
WBC # FLD AUTO: 10.96 K/UL — HIGH (ref 3.8–10.5)

## 2019-12-10 PROCEDURE — 76604 US EXAM CHEST: CPT | Mod: 26

## 2019-12-10 PROCEDURE — 71045 X-RAY EXAM CHEST 1 VIEW: CPT | Mod: 26

## 2019-12-10 RX ORDER — POTASSIUM CHLORIDE 20 MEQ
20 PACKET (EA) ORAL ONCE
Refills: 0 | Status: DISCONTINUED | OUTPATIENT
Start: 2019-12-10 | End: 2019-12-10

## 2019-12-10 RX ORDER — POTASSIUM CHLORIDE 20 MEQ
20 PACKET (EA) ORAL ONCE
Refills: 0 | Status: COMPLETED | OUTPATIENT
Start: 2019-12-10 | End: 2019-12-10

## 2019-12-10 RX ORDER — FUROSEMIDE 40 MG
20 TABLET ORAL ONCE
Refills: 0 | Status: COMPLETED | OUTPATIENT
Start: 2019-12-10 | End: 2019-12-10

## 2019-12-10 RX ORDER — OXYCODONE AND ACETAMINOPHEN 5; 325 MG/1; MG/1
1 TABLET ORAL ONCE
Refills: 0 | Status: DISCONTINUED | OUTPATIENT
Start: 2019-12-10 | End: 2019-12-10

## 2019-12-10 RX ADMIN — OXYCODONE AND ACETAMINOPHEN 1 TABLET(S): 5; 325 TABLET ORAL at 19:44

## 2019-12-10 RX ADMIN — ATORVASTATIN CALCIUM 80 MILLIGRAM(S): 80 TABLET, FILM COATED ORAL at 22:42

## 2019-12-10 RX ADMIN — HEPARIN SODIUM 5000 UNIT(S): 5000 INJECTION INTRAVENOUS; SUBCUTANEOUS at 06:56

## 2019-12-10 RX ADMIN — SODIUM CHLORIDE 3 MILLILITER(S): 9 INJECTION INTRAMUSCULAR; INTRAVENOUS; SUBCUTANEOUS at 06:53

## 2019-12-10 RX ADMIN — OXYCODONE AND ACETAMINOPHEN 1 TABLET(S): 5; 325 TABLET ORAL at 16:09

## 2019-12-10 RX ADMIN — OXYCODONE AND ACETAMINOPHEN 1 TABLET(S): 5; 325 TABLET ORAL at 03:11

## 2019-12-10 RX ADMIN — CLOPIDOGREL BISULFATE 75 MILLIGRAM(S): 75 TABLET, FILM COATED ORAL at 10:54

## 2019-12-10 RX ADMIN — OXYCODONE AND ACETAMINOPHEN 1 TABLET(S): 5; 325 TABLET ORAL at 12:20

## 2019-12-10 RX ADMIN — Medication 81 MILLIGRAM(S): at 10:54

## 2019-12-10 RX ADMIN — Medication 20 MILLIGRAM(S): at 09:35

## 2019-12-10 RX ADMIN — Medication 12.5 MILLIGRAM(S): at 23:49

## 2019-12-10 RX ADMIN — OXYCODONE AND ACETAMINOPHEN 1 TABLET(S): 5; 325 TABLET ORAL at 15:30

## 2019-12-10 RX ADMIN — SODIUM CHLORIDE 3 MILLILITER(S): 9 INJECTION INTRAMUSCULAR; INTRAVENOUS; SUBCUTANEOUS at 21:04

## 2019-12-10 RX ADMIN — Medication 12.5 MILLIGRAM(S): at 12:21

## 2019-12-10 RX ADMIN — HEPARIN SODIUM 5000 UNIT(S): 5000 INJECTION INTRAVENOUS; SUBCUTANEOUS at 14:24

## 2019-12-10 RX ADMIN — Medication 20 MILLIEQUIVALENT(S): at 10:54

## 2019-12-10 RX ADMIN — OXYCODONE AND ACETAMINOPHEN 1 TABLET(S): 5; 325 TABLET ORAL at 18:53

## 2019-12-10 RX ADMIN — Medication 650 MILLIGRAM(S): at 00:15

## 2019-12-10 RX ADMIN — Medication 12.5 MILLIGRAM(S): at 06:57

## 2019-12-10 RX ADMIN — SODIUM CHLORIDE 3 MILLILITER(S): 9 INJECTION INTRAMUSCULAR; INTRAVENOUS; SUBCUTANEOUS at 13:30

## 2019-12-10 RX ADMIN — HEPARIN SODIUM 5000 UNIT(S): 5000 INJECTION INTRAVENOUS; SUBCUTANEOUS at 22:42

## 2019-12-10 RX ADMIN — Medication 12.5 MILLIGRAM(S): at 17:12

## 2019-12-10 RX ADMIN — PANTOPRAZOLE SODIUM 40 MILLIGRAM(S): 20 TABLET, DELAYED RELEASE ORAL at 06:57

## 2019-12-10 RX ADMIN — OXYCODONE AND ACETAMINOPHEN 1 TABLET(S): 5; 325 TABLET ORAL at 13:30

## 2019-12-10 NOTE — PROGRESS NOTE ADULT - SUBJECTIVE AND OBJECTIVE BOX
Patient discussed on morning rounds with Dr. Wooten     Operation / Date: 12/5/19 - Repair of anomalous RCA, transposition of RCA     SUBJECTIVE ASSESSMENT:  Pt is feeling better this morning than yesterday. She complains she still has difficulty seeing in the superior portion of his vision and has some blurry vision when focusing on distant objects but admits it is unchanged from yesterday. She has been ambulating without difficulty. Moved her bowels last yesterday. Using her IS regularly. Denies any CP, palpitations, SOB, wheezing, abdominal pain, N/V/D/C, fevers or chills at this time.     Vital Signs Last 24 Hrs  T(C): 36.6 (10 Dec 2019 09:11), Max: 37.5 (09 Dec 2019 18:43)  T(F): 97.9 (10 Dec 2019 09:11), Max: 99.5 (09 Dec 2019 18:43)  HR: 96 (10 Dec 2019 09:31) (90 - 112)  BP: 119/73 (10 Dec 2019 09:31) (111/74 - 121/73)  BP(mean): 93 (10 Dec 2019 09:31) (85 - 95)  RR: 12 (10 Dec 2019 09:31) (12 - 20)  SpO2: 97% (10 Dec 2019 09:31) (92% - 97%)  I&O's Detail    09 Dec 2019 07:01  -  10 Dec 2019 07:00  --------------------------------------------------------  IN:    Oral Fluid: 440 mL  Total IN: 440 mL    OUT:  Total OUT: 0 mL    Total NET: 440 mL      CHEST TUBE: no  ZEYNEP DRAIN:  no  EPICARDIAL WIRES: Yes.  TIE DOWNS: No.  BURGESS: No.    PHYSICAL EXAM:  General: laying in bed comfortably in no acute distress   Neurological/HEENT: EOMI. Peripheral vision is normal. Unable to visualize the superior portion of her vision with focusing on an object. AOx3. Motor skills grossly intact  Cardiovascular: Normal S1/S2. RRR. No M/R/G.  Respiratory: Lungs CTA b/l. No W/R/R.  Gastrointestinal: +BS 4 quadrants Soft. NT. ND.  Extremities: Strength 5/5 b/l upper/lower extremities. Sensation intact upper/lower extremities b/l. No edema. No calf tenderness.  Vascular: Radial 2+ b/l. DP 2+ b/l.   Incision Sites: Sternotomy incisions without sternal clicks, erythema, purulence, or ecchymosis    LABS:                        10.5   10.96 )-----------( 325      ( 10 Dec 2019 06:11 )             31.5       COUMADIN: no    PT/INR - ( 09 Dec 2019 01:01 )   PT: 13.1 sec;   INR: 1.15          PTT - ( 09 Dec 2019 01:01 )  PTT:41.8 sec    12-10    139  |  103  |  13  ----------------------------<  115<H>  3.8   |  26  |  0.75    Ca    9.0      10 Dec 2019 06:11  Phos  3.3     12-09  Mg     2.0     12-10    TPro  6.3  /  Alb  4.1  /  TBili  0.4  /  DBili  x   /  AST  26  /  ALT  19  /  AlkPhos  65  12-09          MEDICATIONS  (STANDING):  aspirin enteric coated 81 milliGRAM(s) Oral daily  atorvastatin 80 milliGRAM(s) Oral at bedtime  clopidogrel Tablet 75 milliGRAM(s) Oral daily  heparin  Injectable 5000 Unit(s) SubCutaneous every 8 hours  influenza   Vaccine 0.5 milliLiter(s) IntraMuscular once  metoprolol tartrate 12.5 milliGRAM(s) Oral every 6 hours  pantoprazole    Tablet 40 milliGRAM(s) Oral before breakfast  polyethylene glycol 3350 17 Gram(s) Oral daily  potassium chloride   Powder 20 milliEquivalent(s) Oral once  sodium chloride 0.9% lock flush 3 milliLiter(s) IV Push every 8 hours    MEDICATIONS  (PRN):  acetaminophen   Tablet .. 650 milliGRAM(s) Oral every 6 hours PRN Mild Pain (1 - 3)  glucagon  Injectable 1 milliGRAM(s) IntraMuscular once PRN Glucose LESS THAN 70 milligrams/deciliter  oxycodone    5 mG/acetaminophen 325 mG 1 Tablet(s) Oral every 6 hours PRN Severe Pain (7 - 10)        RADIOLOGY & ADDITIONAL TESTS:  < from: Xray Chest 1 View- PORTABLE-Routine (12.09.19 @ 04:01) >  Findings/  impression: Interim removal of right internal jugular line. Heart size   within normal limits, status post median sternotomy. Stable lung   pathology and bony structures.    < end of copied text > Patient discussed on morning rounds with Dr. Wooten     Operation / Date: 12/5/19 - Repair of anomalous RCA, transposition of RCA     SUBJECTIVE ASSESSMENT:  Pt is feeling better this morning than yesterday. She complains she still has difficulty seeing in the superior portion of his vision and has some blurry vision when focusing on distant objects but admits it is unchanged from yesterday. She has been ambulating without difficulty. Moved her bowels last yesterday. Using her IS regularly. Denies any CP, palpitations, SOB, wheezing, abdominal pain, N/V/D/C, fevers or chills at this time.     Vital Signs Last 24 Hrs  T(C): 36.6 (10 Dec 2019 09:11), Max: 37.5 (09 Dec 2019 18:43)  T(F): 97.9 (10 Dec 2019 09:11), Max: 99.5 (09 Dec 2019 18:43)  HR: 96 (10 Dec 2019 09:31) (90 - 112)  BP: 119/73 (10 Dec 2019 09:31) (111/74 - 121/73)  BP(mean): 93 (10 Dec 2019 09:31) (85 - 95)  RR: 12 (10 Dec 2019 09:31) (12 - 20)  SpO2: 97% (10 Dec 2019 09:31) (92% - 97%)  I&O's Detail    09 Dec 2019 07:01  -  10 Dec 2019 07:00  --------------------------------------------------------  IN:    Oral Fluid: 440 mL  Total IN: 440 mL    OUT:  Total OUT: 0 mL    Total NET: 440 mL      CHEST TUBE: no  ZEYNEP DRAIN:  no  EPICARDIAL WIRES: Yes.  TIE DOWNS: No.  BURGESS: No.    PHYSICAL EXAM:  General: laying in bed comfortably in no acute distress   Neurological/HEENT: EOMI. Peripheral vision is normal. Unable to visualize the superior portion of her vision with focusing on an object. AOx3. Motor skills grossly intact  Cardiovascular: Normal S1/S2. RRR. No M/R/G.  Respiratory: Lungs CTA b/l with the exception of decreased breath sounds at the bases b/l. No W/R/R.  Gastrointestinal: +BS 4 quadrants Soft. NT. ND.  Extremities: Strength 5/5 b/l upper/lower extremities. Sensation intact upper/lower extremities b/l. No edema. No calf tenderness.  Vascular: Radial 2+ b/l. DP 2+ b/l.   Incision Sites: Sternotomy incisions without sternal clicks, erythema, purulence, or ecchymosis    LABS:                        10.5   10.96 )-----------( 325      ( 10 Dec 2019 06:11 )             31.5       COUMADIN: no    PT/INR - ( 09 Dec 2019 01:01 )   PT: 13.1 sec;   INR: 1.15          PTT - ( 09 Dec 2019 01:01 )  PTT:41.8 sec    12-10    139  |  103  |  13  ----------------------------<  115<H>  3.8   |  26  |  0.75    Ca    9.0      10 Dec 2019 06:11  Phos  3.3     12-09  Mg     2.0     12-10    TPro  6.3  /  Alb  4.1  /  TBili  0.4  /  DBili  x   /  AST  26  /  ALT  19  /  AlkPhos  65  12-09          MEDICATIONS  (STANDING):  aspirin enteric coated 81 milliGRAM(s) Oral daily  atorvastatin 80 milliGRAM(s) Oral at bedtime  clopidogrel Tablet 75 milliGRAM(s) Oral daily  heparin  Injectable 5000 Unit(s) SubCutaneous every 8 hours  influenza   Vaccine 0.5 milliLiter(s) IntraMuscular once  metoprolol tartrate 12.5 milliGRAM(s) Oral every 6 hours  pantoprazole    Tablet 40 milliGRAM(s) Oral before breakfast  polyethylene glycol 3350 17 Gram(s) Oral daily  potassium chloride   Powder 20 milliEquivalent(s) Oral once  sodium chloride 0.9% lock flush 3 milliLiter(s) IV Push every 8 hours    MEDICATIONS  (PRN):  acetaminophen   Tablet .. 650 milliGRAM(s) Oral every 6 hours PRN Mild Pain (1 - 3)  glucagon  Injectable 1 milliGRAM(s) IntraMuscular once PRN Glucose LESS THAN 70 milligrams/deciliter  oxycodone    5 mG/acetaminophen 325 mG 1 Tablet(s) Oral every 6 hours PRN Severe Pain (7 - 10)        RADIOLOGY & ADDITIONAL TESTS:  < from: Xray Chest 1 View- PORTABLE-Routine (12.09.19 @ 04:01) >  Findings/  impression: Interim removal of right internal jugular line. Heart size   within normal limits, status post median sternotomy. Stable lung   pathology and bony structures.    < end of copied text >

## 2019-12-10 NOTE — CHART NOTE - NSCHARTNOTEFT_GEN_A_CORE
Patient Syeda Stearns is a 34 year old female s/p Sternotomy, repair of anomaolous RCA and transposition of RCA with Dr. Wooten on 12/6/19. Post operatively patient complaining of visual deficits, CT head completed and patient diagnosed with Right PCA infarct on POD#1.     On physical exam patient has left superior quadrantopia consistent with visual field cut 2/2 CVA. EOM are intact without any nystagmus     Patient is followed by neurology and recommending outpatient opthalmology consultation for Prism glasses to help mitigate patient's visual disturbances 2/2 to acute CVA

## 2019-12-10 NOTE — CHART NOTE - NSCHARTNOTEFT_GEN_A_CORE
Exam:  US Chest    Procedure Date: 12/10/19    History: 34y Female whose CXR today shows a possible sided pleural effusion.  Pt is POD #5 from repair anomalous RCA, transposition of RCA post-op CVA.       Findings:                    Evaluation of the right and left sides of the thoracic cavity demonstrates                   small-medium pleural effusions b/l                  Lung is freely movable with in thoracic cavity b/l    Impression:  small to medium b/l pleural effusions without a good window for drainage. Exam:  US Chest    Procedure Date: 12/10/19    History: 34y Female whose CXR today shows a possible sided pleural effusion.  Pt is POD #5 from repair anomalous RCA, transposition of RCA post-op CVA.       Findings:                    Evaluation of the right and left sides of the thoracic cavity demonstrates                   small-moderate pleural effusions b/l                  Lung is freely movable with in thoracic cavity b/l    Impression:  small to moderate b/l pleural effusions without a good window for drainage. R > L

## 2019-12-10 NOTE — DIETITIAN INITIAL EVALUATION ADULT. - OTHER INFO
34F with no significant medical history but with a strong family history of congential heart disease. 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 She was then consulted for CT surgery and deemed a surgical candidate. Now POD 5 repair of RCA, stable on SDU. Course c/b acute CVA POD1. DC planning now for home. Pt observed resting in bed NAD noting hunger. Reports she is not allergic to lactose and is able to have food with milk products in it however is unable to have milk, ice cream etc. No noted n/v/d/c, chewing/ swallowing issues or pain impacting intake, skin with surgical incision. UBW 61kg, does not believe she has lost weight recently. Encouraged adherence to DASH diet to best meet pt needs. Will follow per protocol.

## 2019-12-10 NOTE — PROGRESS NOTE ADULT - ASSESSMENT
This is a 33 y/o female with no significant medical history but with a strong family history of congential heart disease. 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 She was then consulted by Dr. Wooten for CT surgery and deemed a surgical candidate.     She was referred to Dr. Wooten for surgical evaluation and on 12/5/19 she underwent elective repair of anomalous RCA, transposition of the great arteries. The OR course was uncomplicated. Pt was extubated on POD#1 and in the morning she was noted to have visual deficits and a stroke code was called. A head CT showed R PCA noted and neuro was consulted. Recommended no intervention at this time. On POD#2-3 patient remained stable and ambulated. By POD#4 she was stable and was transferred to . On POD#5 visual deficits are persistent but unchanged. Pt to follow up outpatient at [facility] ophthalmology for prism glasses.     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 Ophthmology consulted, f/u with recs. Residual visual deficits in superior portion of her visual fields, Pt needs to get Prism glasses outpatient at [facility]- an appointment was requested today   -PRN's: Acetaminophen 650 Q6 hours (mild pain), Percocet Q6 hours (severe pain)     Cardiovascular: POD #5 Repair of anomalous RCA, transposition of RCA  -Hemodynamically stable. HR controlled.  -Continue to monitor: BP, HR, tele  -CAD: Plavix, ASA, Metoprolol tartrate 12.5mg Q6 hours, Atorvastatin 80mg QHS    Respiratory:   -Pt sating well on RA  -Encourage use of IS 10x / hr while awake and ambulating frequently   -CXR: b/l lower lobe congestion- given 20mg IV lasix today- planning to US this afternoon     GI: Stable.  -PPX: Pantoprazole 40mg daily  -PO Diet: DASH   -Miralax (constipation)     Renal / :  -Monitor renal function: BUN/Cr 13/0.75  -Monitor I/O's.  -Lasix 20 IV + Potassium powder today     Endocrine:  no hx of DM or thyroid dx  -A1c: 5.1  -TSH: 1.66    Hematologic:  -CBC: H/H- 10.5/31.5    ID:  -Temperature: afebrile  -CBC: WBC- 10.96  -Observe for SIRS/Sepsis Syndrome.    Prophylaxis:  -DVT prophylaxis with 5000 SubQ Heparin q8h.  -SCD's    Disposition:  -Discharge home once patient is medically ready This is a 35 y/o female with no significant medical history but with a strong family history of congential heart disease. 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 She was then consulted by Dr. Wooten for CT surgery and deemed a surgical candidate.     She was referred to Dr. Wooten for surgical evaluation and on 12/5/19 she underwent elective repair of anomalous RCA, transposition of the great arteries. The OR course was uncomplicated. Pt was extubated on POD#1 and in the morning she was noted to have visual deficits and a stroke code was called. A head CT showed R PCA noted and neuro was consulted. Recommended no intervention at this time. On POD#2-3 patient remained stable and ambulated. By POD#4 she was stable and was transferred to . On POD#5 visual deficits are persistent but unchanged. Pt to follow up outpatient at [facility] ophthalmology for prism glasses.     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 Ophthmology consulted, f/u with recs. 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 HR controlled.  -Continue to monitor: BP, HR, tele  -CAD: Plavix, ASA, Metoprolol tartrate 12.5mg Q6 hours, Atorvastatin 80mg QHS    Respiratory:   -Pt sating well on RA  -Encourage use of IS 10x / hr while awake and ambulating frequently   -CXR: b/l lower lobe congestion- given 20mg IV lasix today- planning to US this afternoon     GI: Stable.  -PPX: Pantoprazole 40mg daily  -PO Diet: DASH   -Miralax (constipation)     Renal / :  -Monitor renal function: BUN/Cr 13/0.75  -Monitor I/O's.  -Lasix 20 IV + Potassium powder today     Endocrine:  no hx of DM or thyroid dx  -A1c: 5.1  -TSH: 1.66    Hematologic:  -CBC: H/H- 10.5/31.5    ID:  -Temperature: afebrile  -CBC: WBC- 10.96  -Observe for SIRS/Sepsis Syndrome.    Prophylaxis:  -DVT prophylaxis with 5000 SubQ Heparin q8h.  -SCD's    Disposition:  -Discharge home once patient is medically ready This is a 33 y/o female with no significant medical history but with a strong family history of congential heart disease. 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 She was then consulted by Dr. Wooten for CT surgery and deemed a surgical candidate.     She was referred to Dr. Wooten for surgical evaluation and on 12/5/19 she underwent elective repair of anomalous RCA, transposition of the great arteries. The OR course was uncomplicated. Pt was extubated on POD#1 and in the morning she was noted to have visual deficits and a stroke code was called. A head CT showed R PCA noted and neuro was consulted. Recommended no intervention at this time. On POD#2-3 patient remained stable and ambulated. By POD#4 she was stable and was transferred to . On POD#5 visual deficits are persistent but unchanged. Pt to follow up outpatient at [facility] ophthalmology for prism glasses.     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 Ophthmology consulted, f/u with recs. 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 HR controlled.  -Continue to monitor: BP, HR, tele  -CAD: Plavix, ASA, Metoprolol tartrate 12.5mg Q6 hours, Atorvastatin 80mg QHS    Respiratory:   -Pt sating well on RA  -Encourage use of IS 10x / hr while awake and ambulating frequently   -CXR: b/l lower lobe congestion- given 20mg IV lasix today- planning to US this afternoon     GI: Stable.  -PPX: Pantoprazole 40mg daily  -PO Diet: DASH   -Miralax (constipation)     Renal / :  -Monitor renal function: BUN/Cr 13/0.75  -Monitor I/O's.  -Lasix 20 IV + Potassium powder today     Endocrine:  no hx of DM or thyroid dx  -A1c: 5.1  -TSH: 1.66    Hematologic:  -CBC: H/H- 10.5/31.5    ID:  -Temperature: afebrile  -CBC: WBC- 10.96  -Observe for SIRS/Sepsis Syndrome.    Prophylaxis:  -DVT prophylaxis with 5000 SubQ Heparin q8h.  -SCD's    Disposition:  -Discharge home once patient is medically ready

## 2019-12-10 NOTE — DIETITIAN INITIAL EVALUATION ADULT. - ENERGY NEEDS
ABW used for calculations as pt between % of IBW.   ABW 60.8kg, IBW 50kg, 120% IBW, ht 62", BMI 24.5   Nutrient needs based on [facility] standards of care for maintenance in adults, adjusted for post-op needs

## 2019-12-11 ENCOUNTER — TRANSCRIPTION ENCOUNTER (OUTPATIENT)
Age: 34
End: 2019-12-11

## 2019-12-11 VITALS — TEMPERATURE: 99 F

## 2019-12-11 LAB
ANION GAP SERPL CALC-SCNC: 13 MMOL/L — SIGNIFICANT CHANGE UP (ref 5–17)
BUN SERPL-MCNC: 11 MG/DL — SIGNIFICANT CHANGE UP (ref 7–23)
CALCIUM SERPL-MCNC: 9.2 MG/DL — SIGNIFICANT CHANGE UP (ref 8.4–10.5)
CARDIOLIPIN AB SER-ACNC: NEGATIVE — SIGNIFICANT CHANGE UP
CHLORIDE SERPL-SCNC: 100 MMOL/L — SIGNIFICANT CHANGE UP (ref 96–108)
CO2 SERPL-SCNC: 25 MMOL/L — SIGNIFICANT CHANGE UP (ref 22–31)
CREAT SERPL-MCNC: 0.7 MG/DL — SIGNIFICANT CHANGE UP (ref 0.5–1.3)
GLUCOSE SERPL-MCNC: 95 MG/DL — SIGNIFICANT CHANGE UP (ref 70–99)
HCT VFR BLD CALC: 32.7 % — LOW (ref 34.5–45)
HGB BLD-MCNC: 10.6 G/DL — LOW (ref 11.5–15.5)
MAGNESIUM SERPL-MCNC: 1.9 MG/DL — SIGNIFICANT CHANGE UP (ref 1.6–2.6)
MCHC RBC-ENTMCNC: 29.4 PG — SIGNIFICANT CHANGE UP (ref 27–34)
MCHC RBC-ENTMCNC: 32.4 GM/DL — SIGNIFICANT CHANGE UP (ref 32–36)
MCV RBC AUTO: 90.8 FL — SIGNIFICANT CHANGE UP (ref 80–100)
NRBC # BLD: 0 /100 WBCS — SIGNIFICANT CHANGE UP (ref 0–0)
PLATELET # BLD AUTO: 344 K/UL — SIGNIFICANT CHANGE UP (ref 150–400)
POTASSIUM SERPL-MCNC: 3.8 MMOL/L — SIGNIFICANT CHANGE UP (ref 3.5–5.3)
POTASSIUM SERPL-SCNC: 3.8 MMOL/L — SIGNIFICANT CHANGE UP (ref 3.5–5.3)
RBC # BLD: 3.6 M/UL — LOW (ref 3.8–5.2)
RBC # FLD: 12.5 % — SIGNIFICANT CHANGE UP (ref 10.3–14.5)
SODIUM SERPL-SCNC: 138 MMOL/L — SIGNIFICANT CHANGE UP (ref 135–145)
WBC # BLD: 10.91 K/UL — HIGH (ref 3.8–10.5)
WBC # FLD AUTO: 10.91 K/UL — HIGH (ref 3.8–10.5)

## 2019-12-11 PROCEDURE — 71046 X-RAY EXAM CHEST 2 VIEWS: CPT | Mod: 26

## 2019-12-11 PROCEDURE — 71045 X-RAY EXAM CHEST 1 VIEW: CPT | Mod: 26,59

## 2019-12-11 RX ORDER — ATORVASTATIN CALCIUM 80 MG/1
1 TABLET, FILM COATED ORAL
Qty: 30 | Refills: 0
Start: 2019-12-11 | End: 2020-01-09

## 2019-12-11 RX ORDER — METOPROLOL TARTRATE 50 MG
1 TABLET ORAL
Qty: 60 | Refills: 0
Start: 2019-12-11 | End: 2020-01-09

## 2019-12-11 RX ORDER — FUROSEMIDE 40 MG
1 TABLET ORAL
Qty: 7 | Refills: 0
Start: 2019-12-11 | End: 2019-12-17

## 2019-12-11 RX ORDER — POTASSIUM CHLORIDE 20 MEQ
20 PACKET (EA) ORAL ONCE
Refills: 0 | Status: COMPLETED | OUTPATIENT
Start: 2019-12-11 | End: 2019-12-11

## 2019-12-11 RX ORDER — ACETAMINOPHEN 500 MG
2 TABLET ORAL
Qty: 240 | Refills: 0
Start: 2019-12-11 | End: 2020-01-09

## 2019-12-11 RX ORDER — CLOPIDOGREL BISULFATE 75 MG/1
1 TABLET, FILM COATED ORAL
Qty: 30 | Refills: 0
Start: 2019-12-11 | End: 2020-01-09

## 2019-12-11 RX ORDER — PANTOPRAZOLE SODIUM 20 MG/1
1 TABLET, DELAYED RELEASE ORAL
Qty: 30 | Refills: 0
Start: 2019-12-11 | End: 2020-01-09

## 2019-12-11 RX ORDER — POTASSIUM CHLORIDE 20 MEQ
20 PACKET (EA) ORAL ONCE
Refills: 0 | Status: DISCONTINUED | OUTPATIENT
Start: 2019-12-11 | End: 2019-12-11

## 2019-12-11 RX ORDER — POLYETHYLENE GLYCOL 3350 17 G/17G
17 POWDER, FOR SOLUTION ORAL
Qty: 14 | Refills: 0
Start: 2019-12-11 | End: 2019-12-24

## 2019-12-11 RX ORDER — POTASSIUM CHLORIDE 20 MEQ
1 PACKET (EA) ORAL
Qty: 7 | Refills: 0
Start: 2019-12-11 | End: 2019-12-17

## 2019-12-11 RX ORDER — MAGNESIUM OXIDE 400 MG ORAL TABLET 241.3 MG
800 TABLET ORAL ONCE
Refills: 0 | Status: COMPLETED | OUTPATIENT
Start: 2019-12-11 | End: 2019-12-11

## 2019-12-11 RX ORDER — ASPIRIN/CALCIUM CARB/MAGNESIUM 324 MG
1 TABLET ORAL
Qty: 30 | Refills: 0
Start: 2019-12-11 | End: 2020-01-09

## 2019-12-11 RX ORDER — NORETHINDRONE AND ETHINYL ESTRADIOL 0.4-0.035
0 KIT ORAL
Qty: 0 | Refills: 0 | DISCHARGE

## 2019-12-11 RX ADMIN — MAGNESIUM OXIDE 400 MG ORAL TABLET 800 MILLIGRAM(S): 241.3 TABLET ORAL at 08:04

## 2019-12-11 RX ADMIN — HEPARIN SODIUM 5000 UNIT(S): 5000 INJECTION INTRAVENOUS; SUBCUTANEOUS at 06:27

## 2019-12-11 RX ADMIN — PANTOPRAZOLE SODIUM 40 MILLIGRAM(S): 20 TABLET, DELAYED RELEASE ORAL at 06:27

## 2019-12-11 RX ADMIN — Medication 12.5 MILLIGRAM(S): at 12:59

## 2019-12-11 RX ADMIN — Medication 81 MILLIGRAM(S): at 12:58

## 2019-12-11 RX ADMIN — Medication 650 MILLIGRAM(S): at 08:07

## 2019-12-11 RX ADMIN — Medication 650 MILLIGRAM(S): at 08:30

## 2019-12-11 RX ADMIN — Medication 12.5 MILLIGRAM(S): at 06:27

## 2019-12-11 RX ADMIN — Medication 650 MILLIGRAM(S): at 15:53

## 2019-12-11 RX ADMIN — Medication 650 MILLIGRAM(S): at 15:15

## 2019-12-11 RX ADMIN — CLOPIDOGREL BISULFATE 75 MILLIGRAM(S): 75 TABLET, FILM COATED ORAL at 12:58

## 2019-12-11 RX ADMIN — POLYETHYLENE GLYCOL 3350 17 GRAM(S): 17 POWDER, FOR SOLUTION ORAL at 13:04

## 2019-12-11 RX ADMIN — SODIUM CHLORIDE 3 MILLILITER(S): 9 INJECTION INTRAMUSCULAR; INTRAVENOUS; SUBCUTANEOUS at 05:57

## 2019-12-11 RX ADMIN — Medication 20 MILLIEQUIVALENT(S): at 08:04

## 2019-12-11 NOTE — DISCHARGE NOTE NURSING/CASE MANAGEMENT/SOCIAL WORK - NSDCFUADDAPPT_GEN_ALL_CORE_FT
- Please follow up with Dr. Wooten on 12/18/19 at 11:30am. [facility], [place], 4th floor. Call us with any questions #[phone].    - Please follow up with Dr. Pappas (cardiologist) on 12/17/19 at 10am. The office is located at  [address], [address], [place], [phone]    - Please follow up with Dr. Olsen (neurologist) on 12/19/19 at 10am [address], [place], [phone]    - Please follow up with Dr. Koki So ([facility] ophthalmologist) on 12/13/19 at 10:00am. The office is located at [address], [address], (between 2nd/3rd), tel: [phone]

## 2019-12-11 NOTE — DISCHARGE NOTE PROVIDER - NSDCFUADDAPPT_GEN_ALL_CORE_FT
- Please follow up with Dr. Wooten on 12/18/19 at 11:30am. [facility], [place], 4th floor. Call us with any questions #[phone].    - Please follow up with Dr. Pappas (ophthalmologist) on 12/17/19 at 10am. The office is located at  [address], [address], [place], [phone]    - Please follow up with Dr. Olsen (neurologist). The office will call you with an appointment.     - Please follow up with Dr. Koki So on 12/13/19 at 10a. The office is located at [address], 6[address], (between 2nd/3rd), tel: [phone] - Please follow up with Dr. Wooten on 12/18/19 at 11:30am. [facility], [place], 4th floor. Call us with any questions #[phone].    - Please follow up with Dr. Pappas (ophthalmologist) on 12/17/19 at 10am. The office is located at  [address], [address], [place], [phone]    - Please follow up with Dr. Olsen (neurologist) on 12/19/19 at 10am [address] [place], [place], [phone]    - Please follow up with Dr. Koki So on 12/13/19 at 10a. The office is located at [address], 6[address], (between 2nd/3rd), tel: [phone] - Please follow up with Dr. Wooten on 12/18/19 at 11:30am. [facility], [place], 4th floor. Call us with any questions #[phone].    - Please follow up with Dr. Pappas (cardiologist) on 12/17/19 at 10am. The office is located at  [address], [address], [place], [phone]    - Please follow up with Dr. Olsen (neurologist) on 12/19/19 at 10am [address], [place], [phone]    - Please follow up with Dr. Koki So ([facility] ophthalmologist) on 12/13/19 at 10:00am. The office is located at [address], [address], (between 2nd/3rd), tel: [phone]

## 2019-12-11 NOTE — CHART NOTE - NSCHARTNOTEFT_GEN_A_CORE
Pt seen and examined at the bedside. Pt in NSR and non-paced. V wires removed with gentle traction. Pt tolerated the procedure well. Kept in bed for 1 hour following procedure. Pt seen and examined at the bedside. Pt in NSR and non-paced. V wires removed with gentle traction. Pt tolerated the procedure well. Kept in bed for 1 hour following procedure. Pt remained hemodynamically stable.

## 2019-12-11 NOTE — DISCHARGE NOTE PROVIDER - NSDCCPCAREPLAN_GEN_ALL_CORE_FT
PRINCIPAL DISCHARGE DIAGNOSIS  Diagnosis: Coronary artery anomaly  Assessment and Plan of Treatment: Transposition of RCA

## 2019-12-11 NOTE — DISCHARGE NOTE PROVIDER - CARE PROVIDERS DIRECT ADDRESSES
[email or web address]

## 2019-12-11 NOTE — DISCHARGE NOTE PROVIDER - CARE PROVIDER_API CALL
Ventura Wooten)  Surgery; Thoracic and Cardiac Surgery  [address], 4th [place] [address]  Phone: [phone]  Fax: [phone]  Follow Up Time:     Koki So (DO)  [facility]  [address], 6th [place] [address]  Phone: [phone]  Fax: [phone]  Follow Up Time:     Wade Pappas)  Cardiology; Interventional Cardiology  [address] [address]  Phone: [phone]  Fax: [phone]  Follow Up Time:     Miguelangel Olsen)  Neurology; Vascular Neurology  [address], [address] [place] [address]  Phone: [phone]  Fax: [phone]  Follow Up Time:

## 2019-12-11 NOTE — PROGRESS NOTE ADULT - ASSESSMENT
D/C instructions:   - Please follow up with Dr. Wooten on 12/18/19 at 11:30am. [facility], [place], 4th floor. Call us with any questions #[phone].    - Please follow up with Dr. Pappas (ophthalmologist) on 12/17/19 at 10am. The office is located at  [address], [address] [place], [phone]    - Please follow up with Dr. Olsen (neurologist) on 12/19/19 at 10am [address] [place], [place], [phone]    - Please follow up with Dr. Koki So on 12/13/19 at 10a. The office is located at [address], [address], (between 2nd/3rd), tel: [phone]    -No restrictions, Walking - Indoors allowed, Walking - Outdoors allowed    -It is very important that you continue your medications as prescribed after you are discharged.  You should refer to the medication reconciliation form provided to you on your discharge to information as to what medications to take and when.      -Additionally, your medications have been sent to a pharmacy.  These medications should be picked up from your pharmacy on the DAY OF DISCHARGE.  This helps to ensure that you have the appropriate medications available while at home.  If you have any issues obtaining your medications on the day of your discharge, please call [phone] for further assistance.    -Walk daily as tolerated and use your incentive spirometer every hour.    -No driving or strenuous activity/exercise for 6 weeks, or until cleared by your surgeon.    -Gently clean your incisions with anti-bacterial soap and water, pat dry.  You may leave them open to air.    -Call your doctor if you have shortness of breath, chest pain not relieved by pain medication, dizziness, fever >101.5, or increased redness or drainage from incisions.

## 2019-12-11 NOTE — DISCHARGE NOTE NURSING/CASE MANAGEMENT/SOCIAL WORK - PATIENT PORTAL LINK FT
You can access the FollowMyHealth Patient Portal offered by [facility] by registering at the following website: [email or web address]. By joining [employer]’s FollowMyHealth portal, you will also be able to view your health information using other applications (apps) compatible with our system.

## 2019-12-11 NOTE — PROGRESS NOTE ADULT - REASON FOR ADMISSION
Anomalous RCA

## 2019-12-11 NOTE — DISCHARGE NOTE PROVIDER - PROVIDER TOKENS
PROVIDER:[TOKEN:[[id number]]],PROVIDER:[TOKEN:[[id number]]],PROVIDER:[TOKEN:[[id number]]],PROVIDER:[TOKEN:[[id number]]]

## 2019-12-11 NOTE — DISCHARGE NOTE PROVIDER - NSDCFUADDINST_GEN_ALL_CORE_FT
-It is very important that you continue your medications as prescribed after you are discharged.  You should refer to the medication reconciliation form provided to you on your discharge to information as to what medications to take and when.      -Additionally, your medications have been sent to a pharmacy.  These medications should be picked up from your pharmacy on the DAY OF DISCHARGE.  This helps to ensure that you have the appropriate medications available while at home.  If you have any issues obtaining your medications on the day of your discharge, please call [phone] for further assistance.      -Walk daily as tolerated and use your incentive spirometer every hour.    -No driving or strenuous activity/exercise for 6 weeks, or until cleared by your surgeon.    -Gently clean your incisions with anti-bacterial soap and water, pat dry.  You may leave them open to air.    -Call your doctor if you have shortness of breath, chest pain not relieved by pain medication, dizziness, fever >101.5, or increased redness or drainage from incisions.

## 2019-12-11 NOTE — DISCHARGE NOTE PROVIDER - HOSPITAL COURSE
This is a 33 y/o female with no significant medical history but with a strong family history of congential heart disease. 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 She was then consulted by Dr. Wooten for CT surgery and deemed a surgical candidate.         She was referred to Dr. Wooten for surgical evaluation and on 12/5/19 she underwent elective repair of anomalous RCA, transposition of the great arteries. The OR course was uncomplicated. Pt was extubated on POD#1 and in the morning she was noted to have visual deficits and a stroke code was called. A head CT showed R PCA noted and neuro was consulted. Recommended no intervention at this time. On POD#2-3 patient remained stable and ambulated. By POD#4 she was stable and was transferred to . On POD#5 visual deficits are persistent but unchanged. On POD#6 visual deficits have remain stable/unchanged. Pt to follow up outpatient at [facility] ophthalmology for prism glasses. Per Dr. Wooten, pt is medically ready to be discharged home with family support.         Over 35 minutes was spent with the patient reviewing the discharge material including medications, follow up appointments, recovery, concerning symptoms, and how to contact their health care providers if they have questions This is a 35 y/o female with no significant medical history but with a strong family history of congential heart disease. 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 She was then consulted by Dr. Wooten for CT surgery and deemed a surgical candidate.         She was referred to Dr. Wooten for surgical evaluation and on 12/5/19 she underwent elective repair of anomalous RCA, transposition of the great arteries. The OR course was uncomplicated. Pt was extubated on POD#1 and in the morning she was noted to have visual deficits and a stroke code was called. A head CT showed R PCA noted and neuro was consulted. Recommended no intervention at this time. On POD#2-3 patient remained stable and ambulated. By POD#4 she was stable and was transferred to . On POD#5 visual deficits are persistent but unchanged. On POD#6 visual deficits have remain stable/unchanged. Pt to follow up outpatient at [facility] ophthalmology for prism glasses. Pt is sating over 93% on RA with ambulation. US completed on day of discharge with small-moderate pleural effusions. Encourage ambulation, IS use, and chest PT at home. Pt will f/u within 1 week and have repeat Xray and be discharged home with PO lasix. Per Dr. Wooten, pt is medically ready to be discharged home with family support.         Over 35 minutes was spent with the patient reviewing the discharge material including medications, follow up appointments, recovery, concerning symptoms, and how to contact their health care providers if they have questions

## 2019-12-11 NOTE — DISCHARGE NOTE PROVIDER - NSDCFUSCHEDAPPT_GEN_ALL_CORE_FT
SHAWN MUÑIZ ; 12/13/2019 ; [facility] Ophthal [address]  SHAWN MUÑIZ ; 12/18/2019 ; RAF CT Surg [address] [facility] ; 12/13/2019 ; NPP Ophthal [address] [facility] ; 12/18/2019 ; NPP CT Surg [address] [facility] ; 12/19/2019 ; NPP Neuro [address] [facility] ; 12/13/2019 ; NPP Ophthal [address] [facility] ; 12/18/2019 ; NPP CT Surg [address] [facility] ; 12/19/2019 ; NPP Neuro [address] [facility] ; 12/13/2019 ; NPP Ophthal [address] [facility] ; 12/18/2019 ; NPP CT Surg [address] [facility] ; 12/19/2019 ; NPP Neuro [address] [facility] ; 12/13/2019 ; NPP Ophthal [address] [facility] ; 12/18/2019 ; NPP CT Surg [address] [facility] ; 12/19/2019 ; NPP Neuro [address] [facility] ; 12/13/2019 ; NPP Ophthal [address] [facility] ; 12/18/2019 ; NPP CT Surg [address] [facility] ; 12/19/2019 ; NPP Neuro [address]

## 2019-12-11 NOTE — CHART NOTE - NSCHARTNOTEFT_GEN_A_CORE
Exam:  US Chest    Procedure Date: 12/5/19    History: 34y Female whose CXR today shows bilateral pleural effusions.  Pt is POD #6 from repair anomalous RCA, transposition of RCA     Findings:    Evaluation of bilateral sides of the thoracic cavity demonstrates small-moderate b/l pleural effusions. No good window for drainage on either side. Lung is freely movable with in thoracic cavity    Impression:  small to moderate b/l pleural effusions

## 2019-12-11 NOTE — PROGRESS NOTE ADULT - SUBJECTIVE AND OBJECTIVE BOX
Patient discussed on morning rounds with       Operation / Date:     SUBJECTIVE ASSESSMENT:  34y Female         Vital Signs Last 24 Hrs  T(C): 36.7 (11 Dec 2019 09:37), Max: 37.1 (10 Dec 2019 14:00)  T(F): 98 (11 Dec 2019 09:37), Max: 98.7 (10 Dec 2019 14:00)  HR: 92 (11 Dec 2019 08:46) (88 - 106)  BP: 118/77 (11 Dec 2019 08:46) (109/74 - 121/81)  BP(mean): 90 (11 Dec 2019 08:46) (87 - 99)  RR: 16 (11 Dec 2019 08:46) (12 - 16)  SpO2: 95% (11 Dec 2019 08:46) (92% - 96%)  I&O's Detail    10 Dec 2019 07:01  -  11 Dec 2019 07:00  --------------------------------------------------------  IN:    Oral Fluid: 150 mL  Total IN: 150 mL    OUT:    Voided: 300 mL  Total OUT: 300 mL    Total NET: -150 mL          CHEST TUBE:  Yes/No. AIR LEAKS: Yes/No. Suction / H2O SEAL.   ZEYNEP DRAIN:  Yes/No.  EPICARDIAL WIRES: Yes/No.  TIE DOWNS: Yes/No.  BURGESS: Yes/No.    PHYSICAL EXAM:    General:     Neurological:    Cardiovascular:    Respiratory:    Gastrointestinal:    Extremities:    Vascular:    Incision Sites:    LABS:                        10.6   10.91 )-----------( 344      ( 11 Dec 2019 06:09 )             32.7       COUMADIN:  Yes/No. REASON: .        12-11    138  |  100  |  11  ----------------------------<  95  3.8   |  25  |  0.70    Ca    9.2      11 Dec 2019 06:09  Mg     1.9     12-11            MEDICATIONS  (STANDING):  aspirin enteric coated 81 milliGRAM(s) Oral daily  atorvastatin 80 milliGRAM(s) Oral at bedtime  clopidogrel Tablet 75 milliGRAM(s) Oral daily  heparin  Injectable 5000 Unit(s) SubCutaneous every 8 hours  influenza   Vaccine 0.5 milliLiter(s) IntraMuscular once  metoprolol tartrate 12.5 milliGRAM(s) Oral every 6 hours  pantoprazole    Tablet 40 milliGRAM(s) Oral before breakfast  polyethylene glycol 3350 17 Gram(s) Oral daily  sodium chloride 0.9% lock flush 3 milliLiter(s) IV Push every 8 hours    MEDICATIONS  (PRN):  acetaminophen   Tablet .. 650 milliGRAM(s) Oral every 6 hours PRN Mild Pain (1 - 3)  glucagon  Injectable 1 milliGRAM(s) IntraMuscular once PRN Glucose LESS THAN 70 milligrams/deciliter  oxycodone    5 mG/acetaminophen 325 mG 1 Tablet(s) Oral every 6 hours PRN Severe Pain (7 - 10)        RADIOLOGY & ADDITIONAL TESTS: Patient discussed on morning rounds with Dr. Wooten     Operation / Date: 12/5/19 - Repair anomalous RCA, transposition of RCA     SUBJECTIVE ASSESSMENT:  Pt is feeling well this morning and is ready to be discharged with the support of her parents. She has been ambulating without difficulty and using her IS. She moved her bowels last yesterday. Denies any CP, palpitations, SOB, wheezing, abdominal pain, N/v/D/C, fevers or chills at this time.     Vital Signs Last 24 Hrs  T(C): 36.7 (11 Dec 2019 09:37), Max: 37.1 (10 Dec 2019 14:00)  T(F): 98 (11 Dec 2019 09:37), Max: 98.7 (10 Dec 2019 14:00)  HR: 92 (11 Dec 2019 08:46) (88 - 106)  BP: 118/77 (11 Dec 2019 08:46) (109/74 - 121/81)  BP(mean): 90 (11 Dec 2019 08:46) (87 - 99)  RR: 16 (11 Dec 2019 08:46) (12 - 16)  SpO2: 95% (11 Dec 2019 08:46) (92% - 96%)  I&O's Detail    10 Dec 2019 07:01  -  11 Dec 2019 07:00  --------------------------------------------------------  IN:    Oral Fluid: 150 mL  Total IN: 150 mL    OUT:    Voided: 300 mL  Total OUT: 300 mL    Total NET: -150 mL    CHEST TUBE:  no  ZEYNEP DRAIN: no  EPICARDIAL WIRES: no  TIE DOWNS: no.  BURGESS: no    PHYSICAL EXAM:  General: well appearing resting in bed comfortably. No acute distress   Neurological: Focal deficit in visual fields in superior aspect of her vision with associated blurring in the rest of her visual fields (unchanged from last exam)AOx3. Motor skills grossly intact  Cardiovascular: Normal S1/S2. RRR. No M/R/G.  Respiratory: Lungs CTA b/l with some trace crackles at the bases b/l. No W/R/R.  Gastrointestinal: +BS 4 quadrants Soft. NT. ND.  Extremities: Strength 5/5 b/l upper/lower extremities. Sensation intact upper/lower extremities b/l. No edema. No calf tenderness.  Vascular: Radial 2+ b/l. DP 2+ b/l.   Incision Sites: Sternotomy incision without erythema, ecchymosis, or purulence No sternal clicks.       LABS:                        10.6   10.91 )-----------( 344      ( 11 Dec 2019 06:09 )             32.7       COUMADIN:  no        12-11    138  |  100  |  11  ----------------------------<  95  3.8   |  25  |  0.70    Ca    9.2      11 Dec 2019 06:09  Mg     1.9     12-11            MEDICATIONS  (STANDING):  aspirin enteric coated 81 milliGRAM(s) Oral daily  atorvastatin 80 milliGRAM(s) Oral at bedtime  clopidogrel Tablet 75 milliGRAM(s) Oral daily  heparin  Injectable 5000 Unit(s) SubCutaneous every 8 hours  influenza   Vaccine 0.5 milliLiter(s) IntraMuscular once  metoprolol tartrate 12.5 milliGRAM(s) Oral every 6 hours  pantoprazole    Tablet 40 milliGRAM(s) Oral before breakfast  polyethylene glycol 3350 17 Gram(s) Oral daily  sodium chloride 0.9% lock flush 3 milliLiter(s) IV Push every 8 hours    MEDICATIONS  (PRN):  acetaminophen   Tablet .. 650 milliGRAM(s) Oral every 6 hours PRN Mild Pain (1 - 3)  glucagon  Injectable 1 milliGRAM(s) IntraMuscular once PRN Glucose LESS THAN 70 milligrams/deciliter  oxycodone    5 mG/acetaminophen 325 mG 1 Tablet(s) Oral every 6 hours PRN Severe Pain (7 - 10)        RADIOLOGY & ADDITIONAL TESTS:  < from: Xray Chest 1 View- PORTABLE-Routine (12.10.19 @ 07:16) >  Findings/  impression: Heart size within normal limits, status post median   sternotomy. Stable lung pathology and bony structures.    < end of copied text >

## 2019-12-11 NOTE — DISCHARGE NOTE PROVIDER - NSDCMRMEDTOKEN_GEN_ALL_CORE_FT
Microgestin 1/20: Pt takes Microgynon 30 acetaminophen 325 mg oral tablet: 2 tab(s) orally every 6 hours, As needed, Mild Pain (1 - 3) MDD:8  aspirin 81 mg oral delayed release tablet: 1 tab(s) orally once a day  atorvastatin 80 mg oral tablet: 1 tab(s) orally once a day (at bedtime)  clopidogrel 75 mg oral tablet: 1 tab(s) orally once a day x 30 days   Lasix 20 mg oral tablet: 1 tab(s) orally once a day   Metoprolol Tartrate 25 mg oral tablet: 1 tab(s) orally every 12 hours   oxycodone-acetaminophen 5 mg-325 mg oral tablet: 1 tab(s) orally every 6 hours, As needed, Severe Pain (7 - 10) MDD:4 tablets  pantoprazole 40 mg oral delayed release tablet: 1 tab(s) orally once a day (before a meal)  polyethylene glycol 3350 oral powder for reconstitution: 17 gram(s) orally once a day  potassium chloride 10 mEq oral tablet, extended release: 1 tab(s) orally once a day

## 2019-12-13 ENCOUNTER — NON-APPOINTMENT (OUTPATIENT)
Age: 34
End: 2019-12-13

## 2019-12-13 ENCOUNTER — APPOINTMENT (OUTPATIENT)
Dept: OPHTHALMOLOGY | Facility: CLINIC | Age: 34
End: 2019-12-13
Payer: COMMERCIAL

## 2019-12-13 PROCEDURE — 92083 EXTENDED VISUAL FIELD XM: CPT

## 2019-12-13 PROCEDURE — 92002 INTRM OPH EXAM NEW PATIENT: CPT

## 2019-12-13 PROCEDURE — 92133 CPTRZD OPH DX IMG PST SGM ON: CPT

## 2019-12-13 RX ORDER — METOPROLOL TARTRATE 25 MG/1
25 TABLET, FILM COATED ORAL
Refills: 0 | Status: ACTIVE | COMMUNITY

## 2019-12-15 ENCOUNTER — FORM ENCOUNTER (OUTPATIENT)
Age: 34
End: 2019-12-15

## 2019-12-15 DIAGNOSIS — I97.820 POSTPROCEDURAL CEREBROVASCULAR INFARCTION FOLLOWING CARDIAC SURGERY: ICD-10-CM

## 2019-12-15 DIAGNOSIS — J90 PLEURAL EFFUSION, NOT ELSEWHERE CLASSIFIED: ICD-10-CM

## 2019-12-15 DIAGNOSIS — J95.89 OTHER POSTPROCEDURAL COMPLICATIONS AND DISORDERS OF RESPIRATORY SYSTEM, NOT ELSEWHERE CLASSIFIED: ICD-10-CM

## 2019-12-15 DIAGNOSIS — H53.462 HOMONYMOUS BILATERAL FIELD DEFECTS, LEFT SIDE: ICD-10-CM

## 2019-12-15 DIAGNOSIS — I63.59 CEREBRAL INFARCTION DUE TO UNSPECIFIED OCCLUSION OR STENOSIS OF OTHER CEREBRAL ARTERY: ICD-10-CM

## 2019-12-15 DIAGNOSIS — Q24.5 MALFORMATION OF CORONARY VESSELS: ICD-10-CM
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
## 2019-12-15 DIAGNOSIS — J98.11 ATELECTASIS: ICD-10-CM
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
## 2019-12-16 ENCOUNTER — TRANSCRIPTION ENCOUNTER (OUTPATIENT)
Age: 34
End: 2019-12-16

## 2019-12-16 ENCOUNTER — APPOINTMENT (OUTPATIENT)
Dept: CARDIOTHORACIC SURGERY | Facility: CLINIC | Age: 34
End: 2019-12-16
Payer: COMMERCIAL

## 2019-12-16 ENCOUNTER — INBOUND DOCUMENT (OUTPATIENT)
Age: 34
End: 2019-12-16

## 2019-12-16 ENCOUNTER — OUTPATIENT (OUTPATIENT)
Dept: OUTPATIENT SERVICES | Facility: HOSPITAL | Age: 34
LOS: 1 days | End: 2019-12-16
Payer: COMMERCIAL

## 2019-12-16 VITALS
BODY MASS INDEX: 23.74 KG/M2 | DIASTOLIC BLOOD PRESSURE: 61 MMHG | TEMPERATURE: 97.5 F | HEIGHT: 62 IN | WEIGHT: 129 LBS | OXYGEN SATURATION: 95 % | SYSTOLIC BLOOD PRESSURE: 106 MMHG | RESPIRATION RATE: 18 BRPM | HEART RATE: 96 BPM

## 2019-12-16 DIAGNOSIS — Z98.890 OTHER SPECIFIED POSTPROCEDURAL STATES: Chronic | ICD-10-CM

## 2019-12-16 DIAGNOSIS — Q24.5 MALFORMATION OF CORONARY VESSELS: ICD-10-CM

## 2019-12-16 DIAGNOSIS — Z98.890 OTHER SPECIFIED POSTPROCEDURAL STATES: ICD-10-CM

## 2019-12-16 PROCEDURE — 80053 COMPREHEN METABOLIC PANEL: CPT

## 2019-12-16 PROCEDURE — C1768: CPT

## 2019-12-16 PROCEDURE — 83605 ASSAY OF LACTIC ACID: CPT

## 2019-12-16 PROCEDURE — 82962 GLUCOSE BLOOD TEST: CPT

## 2019-12-16 PROCEDURE — 71046 X-RAY EXAM CHEST 2 VIEWS: CPT

## 2019-12-16 PROCEDURE — 70496 CT ANGIOGRAPHY HEAD: CPT

## 2019-12-16 PROCEDURE — 71045 X-RAY EXAM CHEST 1 VIEW: CPT

## 2019-12-16 PROCEDURE — 86900 BLOOD TYPING SEROLOGIC ABO: CPT

## 2019-12-16 PROCEDURE — 86147 CARDIOLIPIN ANTIBODY EA IG: CPT

## 2019-12-16 PROCEDURE — 94002 VENT MGMT INPAT INIT DAY: CPT

## 2019-12-16 PROCEDURE — 71046 X-RAY EXAM CHEST 2 VIEWS: CPT | Mod: 26

## 2019-12-16 PROCEDURE — 0042T: CPT

## 2019-12-16 PROCEDURE — 86923 COMPATIBILITY TEST ELECTRIC: CPT

## 2019-12-16 PROCEDURE — 86901 BLOOD TYPING SEROLOGIC RH(D): CPT

## 2019-12-16 PROCEDURE — 82330 ASSAY OF CALCIUM: CPT

## 2019-12-16 PROCEDURE — 84132 ASSAY OF SERUM POTASSIUM: CPT

## 2019-12-16 PROCEDURE — 36430 TRANSFUSION BLD/BLD COMPNT: CPT

## 2019-12-16 PROCEDURE — C1889: CPT

## 2019-12-16 PROCEDURE — 85025 COMPLETE CBC W/AUTO DIFF WBC: CPT

## 2019-12-16 PROCEDURE — P9016: CPT

## 2019-12-16 PROCEDURE — 84702 CHORIONIC GONADOTROPIN TEST: CPT

## 2019-12-16 PROCEDURE — 85027 COMPLETE CBC AUTOMATED: CPT

## 2019-12-16 PROCEDURE — 86850 RBC ANTIBODY SCREEN: CPT

## 2019-12-16 PROCEDURE — 82803 BLOOD GASES ANY COMBINATION: CPT

## 2019-12-16 PROCEDURE — 70450 CT HEAD/BRAIN W/O DYE: CPT

## 2019-12-16 PROCEDURE — 84100 ASSAY OF PHOSPHORUS: CPT

## 2019-12-16 PROCEDURE — 85730 THROMBOPLASTIN TIME PARTIAL: CPT

## 2019-12-16 PROCEDURE — 97161 PT EVAL LOW COMPLEX 20 MIN: CPT

## 2019-12-16 PROCEDURE — 85610 PROTHROMBIN TIME: CPT

## 2019-12-16 PROCEDURE — 36415 COLL VENOUS BLD VENIPUNCTURE: CPT

## 2019-12-16 PROCEDURE — 85306 CLOT INHIBIT PROT S FREE: CPT

## 2019-12-16 PROCEDURE — 97116 GAIT TRAINING THERAPY: CPT

## 2019-12-16 PROCEDURE — 97530 THERAPEUTIC ACTIVITIES: CPT

## 2019-12-16 PROCEDURE — 99024 POSTOP FOLLOW-UP VISIT: CPT

## 2019-12-16 PROCEDURE — P9045: CPT

## 2019-12-16 PROCEDURE — 83090 ASSAY OF HOMOCYSTEINE: CPT

## 2019-12-16 PROCEDURE — 70498 CT ANGIOGRAPHY NECK: CPT

## 2019-12-16 PROCEDURE — 84295 ASSAY OF SERUM SODIUM: CPT

## 2019-12-16 PROCEDURE — 85303 CLOT INHIBIT PROT C ACTIVITY: CPT

## 2019-12-16 PROCEDURE — 80048 BASIC METABOLIC PNL TOTAL CA: CPT

## 2019-12-16 PROCEDURE — 83735 ASSAY OF MAGNESIUM: CPT

## 2019-12-16 RX ORDER — OXYCODONE HYDROCHLORIDE AND ACETAMINOPHEN 5; 325 MG/1; MG/1
5-325 TABLET ORAL
Qty: 10 | Refills: 0 | Status: COMPLETED | COMMUNITY
End: 2019-12-16

## 2019-12-17 ENCOUNTER — NON-APPOINTMENT (OUTPATIENT)
Age: 34
End: 2019-12-17

## 2019-12-17 ENCOUNTER — APPOINTMENT (OUTPATIENT)
Dept: HEART AND VASCULAR | Facility: CLINIC | Age: 34
End: 2019-12-17
Payer: COMMERCIAL

## 2019-12-17 VITALS — HEART RATE: 88 BPM | OXYGEN SATURATION: 95 % | SYSTOLIC BLOOD PRESSURE: 97 MMHG | DIASTOLIC BLOOD PRESSURE: 57 MMHG

## 2019-12-17 LAB
DNA PLOIDY SPEC FC-IMP: SIGNIFICANT CHANGE UP
PTR INTERPRETATION: SIGNIFICANT CHANGE UP

## 2019-12-17 PROCEDURE — 99214 OFFICE O/P EST MOD 30 MIN: CPT

## 2019-12-18 ENCOUNTER — APPOINTMENT (OUTPATIENT)
Dept: CARDIOTHORACIC SURGERY | Facility: CLINIC | Age: 34
End: 2019-12-18

## 2019-12-19 ENCOUNTER — APPOINTMENT (OUTPATIENT)
Dept: NEUROLOGY | Facility: CLINIC | Age: 34
End: 2019-12-19
Payer: COMMERCIAL

## 2019-12-19 VITALS
SYSTOLIC BLOOD PRESSURE: 93 MMHG | OXYGEN SATURATION: 97 % | HEART RATE: 95 BPM | BODY MASS INDEX: 23.92 KG/M2 | HEIGHT: 62 IN | TEMPERATURE: 98.8 F | DIASTOLIC BLOOD PRESSURE: 60 MMHG | WEIGHT: 130 LBS

## 2019-12-19 DIAGNOSIS — Z86.73 PERSONAL HISTORY OF TRANSIENT ISCHEMIC ATTACK (TIA), AND CEREBRAL INFARCTION W/OUT RESIDUAL DEFICITS: ICD-10-CM

## 2019-12-19 DIAGNOSIS — H53.452 OTHER LOCALIZED VISUAL FIELD DEFECT, LEFT EYE: ICD-10-CM

## 2019-12-19 PROCEDURE — 99214 OFFICE O/P EST MOD 30 MIN: CPT

## 2019-12-19 RX ORDER — NORETHINDRONE ACETATE/ETHINYL ESTRADIOL 1MG-20MCG
1-20 KIT ORAL DAILY
Refills: 0 | Status: DISCONTINUED | COMMUNITY
Start: 2019-11-29 | End: 2019-12-19

## 2019-12-19 RX ORDER — POTASSIUM CHLORIDE 1500 MG/1
20 TABLET, FILM COATED, EXTENDED RELEASE ORAL
Qty: 30 | Refills: 0 | Status: DISCONTINUED | COMMUNITY
End: 2019-12-19

## 2019-12-20 PROBLEM — Z86.73 HISTORY OF RIGHT PCA STROKE: Status: ACTIVE | Noted: 2019-12-20

## 2019-12-20 PROBLEM — H53.452 PERIPHERAL VISUAL FIELD DEFECT OF LEFT EYE: Status: ACTIVE | Noted: 2019-12-20

## 2019-12-20 NOTE — ASSESSMENT
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 PCP in [facility] to repeat her lipid profile in 3 months and may lower dosage. Patient to remain on low statin as explained and to f/u with neurologist in her home country.\par -Continue on dual antiplatelet therapy: aspirin 81mg and plavix for 90 days; patient to f/u with neurologist and d/c one of the therapies\par -BP- defer to cardiologist\par -Peripheral vision: wear prism glasses and patient to f/u with neuro-optho w/ visual field testing and encouraged to use her head turning to improve.\par \par

## 2019-12-20 NOTE — REASON FOR VISIT
[Post Hospitalization] : a post hospitalization visit [Family Member] : family member [Parent] : parent

## 2019-12-20 NOTE — DATA REVIEWED
[de-identified] :  EXAM:  CT ANGIO NECK (W)AW IC                      \par \par EXAM:  CT ANGIO BRAIN (W)AW IC                      \par \par PROCEDURE DATE:  12/06/2019  \par \par \par \par INTERPRETATION:  PROCEDURE: CTA brain with and without intravenous \par contrast.\par \par INDICATION: CVA; visual deficit status post cardiac surgery  \par \par TECHNIQUE: Multiple thin section axial images were obtained through the \par [place] of [place] following the intravenous injection of 80 ml of Optiray \par 350. Multiple 3-D reformatted images were generated from the axial cuts.  \par \par COMPARISON: None\par \par FINDINGS: The CTA examination demonstrates a cut off of the P4 segment of \par the right posterior cerebral artery (series 5 image 200 and series 10 \par image 46) which correlates with the right occipital infarct. The left PCA \par appears intact.\par \par The internal carotid arteries are normal in caliber. There is a normal \par bifurcation into the A1 and M1 segments. The right A1 segment is \par hypoplastic. The anterior communicating artery is present. There is a \par normal MCA bifurcation.\par \par The left vertebral artery is dominant. The vertebral arteries are \par otherwise normal in caliber. The basilar artery is normal in caliber. \par There is a normal bifurcation into the posterior cerebral arteries. \par \par IMPRESSION: Occulusion of the P4 segment of the right PCA.\par \par \par PROCEDURE: CTA neck with and without intravenous contrast.\par \par INDICATION: CVA; visual deficit status post cardiac surgery\par \par TECHNIQUE: Multiple axial thin section were obtained through the neck \par following the intravenous injection of 80 ml of Optiray 350. Multiple 3-D \par reformatted images were generated from the axial cuts.  \par \par COMPARISON: None\par \par FINDINGS: The CTA examination demonstrates the right common carotid \par artery to be normal in caliber. There is a normal bifurcation into the \par right internal and external carotid arteries. There is no hemodynamically \par significant stenosis. \par \par The left common carotid artery is normal in caliber. There is a normal \par bifurcation into the left internal and external carotid arteries. There \par is no hemodynamically significant stenosis. \par \par The left vertebral artery is dominant. The vertebral arteries are normal \par in caliber.\par \par The aortic arch appears intact without narrowing of the origin of the \par great vessels.\par \par Limited evaluation of the neck and chest demonstrates the patient to be \par status post sternotomy with postsurgical changes. A right-sided CVP line \par is present.\par \par IMPRESSION: Normal CTA of the neck.\par

## 2019-12-20 NOTE — PHYSICAL EXAM
[] : no respiratory distress [Auscultation Breath Sounds / Voice Sounds] : lungs were clear to auscultation bilaterally [Respiration, Rhythm And Depth] : normal respiratory rhythm and effort [Heart Rate And Rhythm] : heart rate was normal and rhythm regular [Arterial Pulses Carotid] : carotid pulses were normal with no bruits [Heart Sounds] : normal S1 and S2 [FreeTextEntry1] : Constitutional: alert, in no acute distress, well nourished and well developed.\par Psychiatric:   insight and judgment were intact.\par \par Neurologic: \par Mental Status: The patient is alert, attentive, and oriented to person, place, and time. Speech is clear and fluent with good repetition, comprehension, and naming.  \par Cranial nerves:\par CN II: Visual fields are full to confrontation+++still has mild difficulty with the left eye. Fundoscopic exam is normal with sharp discs and no vascular changes.Visual acuity is intact. \par CN III, IV, VI: EOMI, PERRLA\par CN V: Facial sensation is intact to pinprick in all 3 divisions bilaterally. Corneal responses are intact.\par CN VII: Face is symmetric with normal eye closure and smile.\par CN VIII: Hearing is normal to rubbing fingers\par CN IX, X: Palate elevates symmetrically. Phonation is normal.\par CN XI: Head turning and shoulder shrug are intact and symmetric.\par CN XII: Tongue is midline with normal movements and no atrophy and no deviation with protrusion.\par Motor: There is no pronator drift of out-stretched arms. Muscle bulk and tone is normal. Strength is full bilaterally 5/5 on both UE and both LE. \par Sensation: light touch is intact; pinprick intact; vibration b/l intact.\par Reflexes:Reflexes are 2+ and symmetric at the biceps, triceps, knees, and ankles.\par Coordination: Rapid alternating movements and fine finger movements are intact. There is no dysmetria on finger-to-nose and heel-knee-shin. There are no abnormal or extraneous movements. Negative Romberg. \par Gait/Stance: Posture is normal. Gait is steady with normal steps, base, arm swing, and turning. Heel and toe walking are normal. \par \par Eyes: sclera and conjunctiva were normal.\par \par

## 2019-12-20 NOTE — HISTORY OF PRESENT ILLNESS
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 Present at the visit are her parents. Patient is visiting from [place]- is returning back this Saturday. \par \par Patient reports:\par -brightness affects both her eyes\par -optometrist followed up on Monday- he fitted her for prism glasses; they will ship them to her. Patient returning to [facility]- Saturday. His name is Pieter Alarcon  [phone]\par -Peripheral vision both sides affected, left side is more affected- can see but very blurry\par -top and bottom vision is blurry but has improved.\par -wears glasses generally for long distance since her 20s\par \par Has occasional headaches- frontal area; took Tylenol, took time to take effect.\par No weakness\par \par family history\par -coronary arteries on father's side\par -strokes in the 80s in the mother's side\par \par Patient states she had a fever of 101 yesterday after walking out in the cold all day- took Tylenol in the e evening. Feels still tired but no fever today.

## 2020-08-17 ENCOUNTER — NON-APPOINTMENT (OUTPATIENT)
Age: 35
End: 2020-08-17

## 2020-08-17 ENCOUNTER — APPOINTMENT (OUTPATIENT)
Dept: OPHTHALMOLOGY | Facility: CLINIC | Age: 35
End: 2020-08-17
Payer: COMMERCIAL

## 2020-08-17 PROCEDURE — 92014 COMPRE OPH EXAM EST PT 1/>: CPT

## 2020-08-17 PROCEDURE — 92083 EXTENDED VISUAL FIELD XM: CPT

## 2020-08-18 ENCOUNTER — APPOINTMENT (OUTPATIENT)
Dept: HEART AND VASCULAR | Facility: CLINIC | Age: 35
End: 2020-08-18
Payer: COMMERCIAL

## 2020-08-18 PROCEDURE — 99214 OFFICE O/P EST MOD 30 MIN: CPT

## 2021-04-07 ENCOUNTER — OUTPATIENT (OUTPATIENT)
Dept: OUTPATIENT SERVICES | Facility: HOSPITAL | Age: 36
LOS: 1 days | End: 2021-04-07

## 2021-04-07 ENCOUNTER — APPOINTMENT (OUTPATIENT)
Dept: HEART AND VASCULAR | Facility: CLINIC | Age: 36
End: 2021-04-07
Payer: COMMERCIAL

## 2021-04-07 ENCOUNTER — NON-APPOINTMENT (OUTPATIENT)
Age: 36
End: 2021-04-07

## 2021-04-07 VITALS — HEART RATE: 81 BPM | DIASTOLIC BLOOD PRESSURE: 80 MMHG | SYSTOLIC BLOOD PRESSURE: 135 MMHG

## 2021-04-07 DIAGNOSIS — Z98.890 OTHER SPECIFIED POSTPROCEDURAL STATES: Chronic | ICD-10-CM

## 2021-04-07 DIAGNOSIS — Z11.52 ENCOUNTER FOR SCREENING FOR COVID-19: ICD-10-CM

## 2021-04-07 PROCEDURE — 99072 ADDL SUPL MATRL&STAF TM PHE: CPT

## 2021-04-07 PROCEDURE — 99213 OFFICE O/P EST LOW 20 MIN: CPT

## 2021-04-07 RX ORDER — PANTOPRAZOLE 40 MG/1
40 TABLET, DELAYED RELEASE ORAL DAILY
Qty: 30 | Refills: 0 | Status: DISCONTINUED | COMMUNITY
End: 2021-04-07

## 2021-04-07 RX ORDER — FUROSEMIDE 20 MG/1
20 TABLET ORAL DAILY
Qty: 30 | Refills: 0 | Status: DISCONTINUED | COMMUNITY
End: 2021-04-07

## 2021-04-07 RX ORDER — ATORVASTATIN CALCIUM 80 MG/1
80 TABLET, FILM COATED ORAL DAILY
Qty: 30 | Refills: 0 | Status: DISCONTINUED | COMMUNITY
End: 2021-04-07

## 2021-04-07 RX ORDER — ACETAMINOPHEN 325 MG/1
325 TABLET ORAL EVERY 6 HOURS
Qty: 60 | Refills: 0 | Status: DISCONTINUED | COMMUNITY
End: 2021-04-07

## 2021-04-07 RX ORDER — CLOPIDOGREL BISULFATE 75 MG/1
75 TABLET, FILM COATED ORAL DAILY
Refills: 0 | Status: DISCONTINUED | COMMUNITY
End: 2021-04-07

## 2021-04-07 NOTE — HISTORY OF PRESENT ILLNESS
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 Wooten in 2019 has followed up ever since and CP episodes have resolved. Last TTE from 11/2019 with normal RV/LV function and normal valve function. \par Pt had CVA post op with occlusion of P4 of R PCA has been doing visual therapy and followed up with ophtalmic institute doing ok currently and enrolled in rehab. No progression, still has some defect on L visual field.\par \par Pt denies CP, palpitations RILEY, has been exercising (plays golf) w/o issues.

## 2021-04-07 NOTE — PHYSICAL EXAM
[General Appearance - Well Developed] : well developed [Normal Appearance] : normal appearance [Normal Jugular Venous V Waves Present] : normal jugular venous V waves present [] : no respiratory distress [Respiration, Rhythm And Depth] : normal respiratory rhythm and effort [Auscultation Breath Sounds / Voice Sounds] : lungs were clear to auscultation bilaterally [Heart Rate And Rhythm] : heart rate and rhythm were normal [Heart Sounds] : normal S1 and S2 [Murmurs] : no murmurs present [Arterial Pulses Normal] : the arterial pulses were normal [Oriented To Time, Place, And Person] : oriented to person, place, and time

## 2022-04-26 NOTE — PROGRESS NOTE ADULT - SUBJECTIVE AND OBJECTIVE BOX
Problem: At Risk for Falls  Goal: # Patient does not fall  Outcome: Outcome Not Met, Plan Adjusted  Goal: # Takes action to control fall-related risks  Outcome: Outcome Not Met, Plan Adjusted  Goal: # Verbalizes understanding of fall risk/precautions  Description: Document education using the patient education activity  Outcome: Outcome Not Met, Plan Adjusted     Problem: At Risk for Injury Due to Fall  Goal: # Patient does not fall  Outcome: Outcome Not Met, Plan Adjusted  Goal: # Takes action to control condition specific risks  Outcome: Outcome Not Met, Plan Adjusted  Goal: # Verbalizes understanding of fall-related injury personal risks  Description: Document education using the patient education activity  Outcome: Outcome Not Met, Plan Adjusted     Problem: Pressure Injury, Risk for  Goal: # Skin remains intact  Outcome: Outcome Not Met, Plan Adjusted  Goal: No new pressure injury (PI) development  Outcome: Outcome Not Met, Plan Adjusted  Goal: # Verbalizes understanding of PI risk factors and prevention strategies  Description: Document education using the patient education activity.   Outcome: Outcome Not Met, Plan Adjusted      CTICU  CRITICAL  CARE  attending     Hand off received 					   Pertinent clinical, laboratory, radiographic, hemodynamic, echocardiographic, respiratory data, microbiologic data and chart were reviewed and analyzed frequently throughout the course of the day and night  Patient seen and examined with CTS/ SH attending at bedside  Pt is a 34y , Female, HEALTH ISSUES - PROBLEM Dx:      , FAMILY HISTORY:  FHx: congenital heart disease: father and grandfather both had &quot;similar&quot; congenital abnormalities of the heart. Patient does not know specifics, but knows that her grandfather&#x27;s needed surgical repair  PAST MEDICAL & SURGICAL HISTORY:  Anomalous origin of right coronary artery  H/O inguinal hernia repair: age 4    Patient is a 34y old  Female who presents with a chief complaint of Anomalous RCA (03 Dec 2019 15:49)      14 system review was unremarkable    Vital signs, hemodynamic and respiratory parameters were reviewed from the bedside nursing flowsheet.  ICU Vital Signs Last 24 Hrs  T(C): 36.4 (05 Dec 2019 21:15), Max: 36.4 (05 Dec 2019 21:15)  T(F): 97.5 (05 Dec 2019 21:15), Max: 97.5 (05 Dec 2019 21:15)  HR: 104 (05 Dec 2019 23:00) (94 - 104)  BP: 110/63 (05 Dec 2019 19:30) (110/63 - 110/63)  BP(mean): 89 (05 Dec 2019 19:30) (89 - 89)  ABP: 138/72 (05 Dec 2019 23:00) (92/62 - 158/90)  ABP(mean): 93 (05 Dec 2019 23:00) (74 - 112)  RR: 12 (05 Dec 2019 23:00) (6 - 19)  SpO2: 100% (05 Dec 2019 23:00) (99% - 100%)    Adult Advanced Hemodynamics Last 24 Hrs  CVP(mm Hg): 14 (05 Dec 2019 23:00) (5 - 22)  CVP(cm H2O): --  CO: --  CI: --  PA: --  PA(mean): --  PCWP: --  SVR: --  SVRI: --  PVR: --  PVRI: --, ABG - ( 05 Dec 2019 21:44 )  pH, Arterial: 7.39  pH, Blood: x     /  pCO2: 37    /  pO2: 211   / HCO3: 22    / Base Excess: -2.6  /  SaO2: 99                Mode: AC/ CMV (Assist Control/ Continuous Mandatory Ventilation)  RR (machine): 16  TV (machine): 550  FiO2: 50  PEEP: 5  ITime: 1  MAP: 11  PIP: 26    Intake and output was reviewed and the fluid balance was calculated  Daily     Daily   I&O's Summary    05 Dec 2019 07:01  -  05 Dec 2019 23:06  --------------------------------------------------------  IN: 573 mL / OUT: 380 mL / NET: 193 mL        All lines and drain sites were assessed  [facility]      POCT Blood Glucose.: 138 mg/dL (05 Dec 2019 23:03)    No acute change in mental status  Auscultation of the chest reveals equal bs  Abdomen is soft  Extremities are warm and well perfused  Wounds appear clean and unremarkable  Antibiotics are periop    labs  CBC Full  -  ( 05 Dec 2019 21:41 )  WBC Count : 22.01 K/uL  RBC Count : 3.23 M/uL  Hemoglobin : 10.0 g/dL  Hematocrit : 28.6 %  Platelet Count - Automated : 221 K/uL  Mean Cell Volume : 88.5 fl  Mean Cell Hemoglobin : 31.0 pg  Mean Cell Hemoglobin Concentration : 35.0 gm/dL  Auto Neutrophil # : x  Auto Lymphocyte # : x  Auto Monocyte # : x  Auto Eosinophil # : x  Auto Basophil # : x  Auto Neutrophil % : x  Auto Lymphocyte % : x  Auto Monocyte % : x  Auto Eosinophil % : x  Auto Basophil % : x    12-05    143  |  107  |  8   ----------------------------<  153<H>  4.5   |  21<L>  |  0.73    Ca    9.0      05 Dec 2019 21:41  Phos  3.0     12-05  Mg     2.4     12-05    TPro  6.0  /  Alb  3.9  /  TBili  0.8  /  DBili  x   /  AST  49<H>  /  ALT  30  /  AlkPhos  49  12-05    PT/INR - ( 05 Dec 2019 21:41 )   PT: 13.3 sec;   INR: 1.17          PTT - ( 05 Dec 2019 21:41 )  PTT:26.3 sec  The current medications were reviewed   MEDICATIONS  (STANDING):  aspirin enteric coated 81 milliGRAM(s) Oral daily  ceFAZolin  Injectable. 2000 milliGRAM(s) IV Push every 8 hours  chlorhexidine 0.12% Liquid 15 milliLiter(s) Oral Mucosa every 12 hours  dexMEDEtomidine Infusion 0.2 MICROgram(s)/kG/Hr (3.04 mL/Hr) IV Continuous <Continuous>  dextrose 50% Injectable 50 milliLiter(s) IV Push every 15 minutes  dextrose 50% Injectable 25 milliLiter(s) IV Push every 15 minutes  famotidine Injectable 20 milliGRAM(s) IV Push daily  fentaNYL    Injectable 25 MICROGram(s) IV Push once  heparin  Injectable 5000 Unit(s) SubCutaneous every 8 hours  influenza   Vaccine 0.5 milliLiter(s) IntraMuscular once  insulin regular Infusion 2 Unit(s)/Hr (2 mL/Hr) IV Continuous <Continuous>  propofol Infusion 10 MICROgram(s)/kG/Min (3.648 mL/Hr) IV Continuous <Continuous>  sodium chloride 0.9%. 1000 milliLiter(s) (10 mL/Hr) IV Continuous <Continuous>    MEDICATIONS  (PRN):  fentaNYL    Injectable 25 MICROGram(s) IV Push every 3 hours PRN Severe Pain (7 - 10)       PROBLEM LIST/ ASSESSMENT:  HEALTH ISSUES - PROBLEM Dx:      ,   Patient is a 34y old  Female who presents with a chief complaint of Anomalous RCA (03 Dec 2019 15:49)     s/p cardiac surgery              My plan includes :  close hemodynamic, ventilatory and drain monitoring and management per post op routine    Monitor for arrhythmias and monitor parameters for organ perfusion  beta blockade not administered due to hemodynamic instability and bradycardia  monitor neurologic status  Head of the bed should remain elevated to 45 deg .   chest PT and IS will be encouraged  monitor adequacy of oxygenation and ventilation and attempt to wean oxygen  antibiotic regimen will be tailored to the clinical, laboratory and microbiologic data  Nutritional goals will be met using po eventually , ensure adequate caloric intake and montior the same  Stress ulcer and VTE prophylaxis will be achieved    Glycemic control is satisfactory  Electrolytes have been repleted as necessary and wound care has been carried out. Pain control has been achieved.   agressive physical therapy and early mobility and ambulation goals will be met   The family was updated about the course and plan  CRITICAL CARE TIME personally provided by me  in evaluation and management, reassessments, review and interpretation of labs and x-rays, ventilator and hemodynamic management, formulating a plan and coordinating care: ___90____ MIN.  Time does not include procedural time.  CTICU ATTENDING     					    Hung Noel MD

## 2024-11-07 NOTE — PATIENT PROFILE ADULT - NSPRESCRALCFREQ_GEN_A_NUR
Pain control with analgesics  Humidifier at bedside  Saline / nasal irrigation, and suction prn     Advised RTC PRN fever > 102 for > 3-4 days, or persistent Sx > 10 days, or PRN other concern.     Monthly or less

## 2025-05-09 NOTE — H&P ADULT - LYMPH NODES
Patient called would like to move up her surgery date if possible, she is in horrible pain from her periods. Thanks.    not examined